# Patient Record
Sex: FEMALE | Race: WHITE | HISPANIC OR LATINO | Employment: UNEMPLOYED | URBAN - METROPOLITAN AREA
[De-identification: names, ages, dates, MRNs, and addresses within clinical notes are randomized per-mention and may not be internally consistent; named-entity substitution may affect disease eponyms.]

---

## 2017-01-24 ENCOUNTER — ALLSCRIPTS OFFICE VISIT (OUTPATIENT)
Dept: OTHER | Facility: OTHER | Age: 62
End: 2017-01-24

## 2017-04-18 ENCOUNTER — ALLSCRIPTS OFFICE VISIT (OUTPATIENT)
Dept: OTHER | Facility: OTHER | Age: 62
End: 2017-04-18

## 2017-05-16 ENCOUNTER — ALLSCRIPTS OFFICE VISIT (OUTPATIENT)
Dept: OTHER | Facility: OTHER | Age: 62
End: 2017-05-16

## 2017-08-08 ENCOUNTER — ALLSCRIPTS OFFICE VISIT (OUTPATIENT)
Dept: OTHER | Facility: OTHER | Age: 62
End: 2017-08-08

## 2017-10-23 ENCOUNTER — HOSPITAL ENCOUNTER (OUTPATIENT)
Dept: RADIOLOGY | Age: 62
Discharge: HOME | End: 2017-10-23
Payer: COMMERCIAL

## 2017-10-23 DIAGNOSIS — Z12.31 ENCOUNTER FOR SCREENING MAMMOGRAM FOR MALIGNANT NEOPLASM OF BREAST: ICD-10-CM

## 2017-10-23 PROCEDURE — G0202 SCR MAMMO BI INCL CAD: HCPCS

## 2017-11-02 ENCOUNTER — ALLSCRIPTS OFFICE VISIT (OUTPATIENT)
Dept: OTHER | Facility: OTHER | Age: 62
End: 2017-11-02

## 2018-01-10 NOTE — PSYCH
Psych Med Mgmt    Appearance: was calm and cooperative  Observed mood: mood appropriate  Observed mood: affect appropriate  Speech: a normal rate  Thought processes: coherent/organized  Hallucinations: no hallucinations present  Thought Content: no delusions  Abnormal Thoughts: The patient has no suicidal thoughts  Orientation: The patient is oriented to person, place and time  Recent and Remote Memory: short term memory intact and long term memory intact  Attention Span And Concentration: concentration intact  Insight: Insight intact  Judgment: Her judgment was intact  Treatment Recommendations: Follow up in 3 months  Increase Lamictal to two 25 mg tablets daily  Continue on 20 mg of Adderall and include 15 extra pills if needed for heavy concentration  She reports increased appetite, normal energy level, no weight change and normal number of sleep hours  Patient reports that she has been doing fairly well on her medications, but has been feeling overwhelmed by various political situations that have occurred over the past several months, including the election and death of Alee Rosario  She reports that she has only been taking one Lamictal daily, but would like to increase it two, which had been effective in the past  The current dose of Adderall has been helpful for concentrating on simple things  She is not having any cardiac symptoms  She is eating and sleeping well, and trying to become more physically active  Assessment    1  ADHD (attention deficit hyperactivity disorder) (314 01) (F90 9)   2  Bipolar affective disorder in remission (296 80) (F31 70)    Plan    1  Amphetamine-Dextroamphet ER 20 MG Oral Capsule Extended Release 24   Hour; TAKE 1 CAPSULE DAILY FOR ADHD, and may take an additional   tablet if needed    2   LamoTRIgine 25 MG Oral Tablet (LaMICtal); TAKE 2 TABLETS DAILY    Review of Systems    Constitutional: No fever, no chills, feels well, no tiredness, no recent weight gain or loss  Cardiovascular: no complaints of slow or fast heart rate, no chest pain, no palpitations  Respiratory: no complaints of shortness of breath, no wheezing, no dyspnea on exertion  Gastrointestinal: no complaints of abdominal pain, no constipation, no nausea, no diarrhea, no vomiting  Genitourinary: no complaints of dysuria, no incontinence, no pelvic pain, no urinary frequency  Musculoskeletal: no complaints of arthralgia, no myalgias, no limb pain, no joint stiffness  Integumentary: no complaints of skin rash, no itching, no dry skin  Active Problems    1  ADHD (attention deficit hyperactivity disorder) (314 01) (F90 9)   2  Bipolar affective disorder in remission (296 80) (F31 70)    Allergies    1  ACE Inhibitors    Current Meds   1  Amphetamine-Dextroamphet ER 20 MG Oral Capsule Extended Release 24 Hour; TAKE   1 CAPSULE DAILY FOR ADHD; Therapy: 88VOD8738 to (Evaluate:12Oct2016); Last Rx:05Oct2016 Ordered   2  LamoTRIgine 25 MG Oral Tablet; one daily for 2 weeks then increase to one twice daily; Therapy: 85ZDC4313 to (Last Rx:05Oct2016)  Requested for: 05Oct2016 Ordered    Family Psych History  Mother    1  No pertinent family history    Social History    · Never a smoker    End of Encounter Meds    1  Amphetamine-Dextroamphet ER 20 MG Oral Capsule Extended Release 24 Hour; TAKE   1 CAPSULE DAILY FOR ADHD, and may take an additional tablet if   needed; Therapy: 43KAM3500 to (Evaluate:83Xil4904); Last Rx:24Jan2017 Ordered    2  LamoTRIgine 25 MG Oral Tablet (LaMICtal); TAKE 2 TABLETS DAILY;    Therapy: 03UAD9702 to [phone])  Requested for: 04VIU6681; Last   GD:95ABF5502 Ordered    Signatures   Electronically signed by : Christine Mendoza MD; Jan 24 2017  4:35PM EST                       (Author)

## 2018-01-14 NOTE — PSYCH
Psych Med Mgmt    Appearance: was calm and cooperative  Observed mood: mood appropriate  Observed mood: affect appropriate  Speech: a normal rate  Thought processes: coherent/organized  Hallucinations: no hallucinations present  Thought Content: no delusions  Abnormal Thoughts: The patient has no suicidal thoughts  Orientation: The patient is oriented to person, place and time  Recent and Remote Memory: short term memory intact and long term memory intact  Attention Span And Concentration: concentration intact  Insight: Insight intact  Judgment: Her judgment was intact  Treatment Recommendations: continue on current medications and follow up in 3 months  She reports normal appetite, normal energy level, no weight change and normal number of sleep hours  Patient has been stable on her current medications  At her last visit, Wellbutrin 150 mg QD was added, which she feels has been beneficial in helping to lift her mood  She still notices that she sometimes feels unmotivated doing certain things, but is comfortable keeping her current dose  She recently lost her 1year old great nephew after he drowned in the [place] last month (July 15th)  She says that is was a sad event, but "hasn't kept me from being able to function " She was open to talking about the event, and did not become tearful at any point during the conversation  She continues to eat and sleep well  Plan    1  Amphetamine-Dextroamphet ER 20 MG Oral Capsule Extended Release 24   Hour; TAKE 2 CAPSULES DAILY    2  BuPROPion HCl ER (XL) 150 MG Oral Tablet Extended Release 24 Hour; TAKE 1   TABLET DAILY AS DIRECTED   3  LamoTRIgine 25 MG Oral Tablet (LaMICtal); TAKE 2 TABLETS DAILY    Review of Systems    Constitutional: No fever, no chills, feels well, no tiredness, no recent weight gain or loss  Cardiovascular: no complaints of slow or fast heart rate, no chest pain, no palpitations     Respiratory: no complaints of shortness of breath, no wheezing, no dyspnea on exertion  Gastrointestinal: no complaints of abdominal pain, no constipation, no nausea, no diarrhea, no vomiting  Genitourinary: no complaints of dysuria, no incontinence, no pelvic pain, no urinary frequency  Musculoskeletal: no complaints of arthralgia, no myalgias, no limb pain, no joint stiffness  Integumentary: no complaints of skin rash, no itching, no dry skin  Neurological: no complaints of headache, no confusion, no numbness, no dizziness  Active Problems    1  ADHD (attention deficit hyperactivity disorder) (314 01) (F90 9)   2  Bipolar affective disorder in remission (296 80) (F31 70)    Allergies    1  ACE Inhibitors    Current Meds   1  Amphetamine-Dextroamphet ER 20 MG Oral Capsule Extended Release 24 Hour; TAKE   2 CAPSULES DAILY; Therapy: 03ONN9274 to (Evaluate:15Jun2017); Last Rx:65Cwa9947 Ordered   2  BuPROPion HCl ER (XL) 150 MG Oral Tablet Extended Release 24 Hour; TAKE 1   TABLET DAILY AS DIRECTED; Therapy: 17XGD6393 to (Evaluate:48Fsq0080); Last Rx:64Fzt4582 Ordered   3  LamoTRIgine 25 MG Oral Tablet; TAKE 2 TABLETS DAILY; Therapy: 99GZX5596 to (Evaluate:15Oct2017)  Requested for: 18Apr2017; Last   Rx:33Qwk6621 Ordered    Family Psych History  Mother    1  No pertinent family history    Social History    · Never a smoker    End of Encounter Meds    1  Amphetamine-Dextroamphet ER 20 MG Oral Capsule Extended Release 24 Hour; TAKE   2 CAPSULES DAILY; Therapy: 20ATA0275 to (Evaluate:80Yui7294); Last Rx:02Pfp2800 Ordered    2  BuPROPion HCl ER (XL) 150 MG Oral Tablet Extended Release 24 Hour; TAKE 1   TABLET DAILY AS DIRECTED; Therapy: 52MIA2640 to (Evaluate:06Nov2017); Last Rx:87Sla0413 Ordered   3  LamoTRIgine 25 MG Oral Tablet (LaMICtal); TAKE 2 TABLETS DAILY;    Therapy: 73GUV4987 to (Evaluate:26Rgq8125)  Requested for: 89Agq7981; Last   Rx:71Mmx3164 Ordered    Signatures   Electronically signed by : Colby Au Davida Willett, [facility];  Aug  8 2017  4:16PM EST                       (Author)    Electronically signed by : Sheree Potter MD; Aug  8 2017  4:39PM EST

## 2018-01-14 NOTE — PSYCH
Psych Med Mgmt    Appearance: was calm and cooperative  Observed mood: mood appropriate  Observed mood: affect appropriate  Speech: a normal rate  Thought processes: coherent/organized  Hallucinations: no hallucinations present  Thought Content: no delusions  Abnormal Thoughts: The patient has no suicidal thoughts  Orientation: The patient is oriented to person, place and time  Recent and Remote Memory: short term memory intact and long term memory intact  Attention Span And Concentration: concentration intact  Insight: Insight intact  Judgment: Her judgment was intact  Treatment Recommendations: continue on current medications and follow up in 3 months  She reports normal appetite, normal energy level, no weight change and normal number of sleep hours  Patient reports that she has been feeling very good because her "life is finally starting to turn around " Her divorce is finally moving forward, and she recently got a settlement on her house as well as got to keep a lot of the belongings from the house  This had been causing her a lot of stress at one point  Patient's focus and concentration has been maintained on the Adderall, and she is tolerating the Wellbutrin and Lamictal without serious side effects  She does notice dry mouth at times, but this is tolerable  Sleep has been improving and she is on a more consistent sleep schedule  Appetite is good  Plan    1  Amphetamine-Dextroamphet ER 20 MG Oral Capsule Extended Release 24   Hour; TAKE 2 CAPSULES DAILY    2  BuPROPion HCl ER (XL) 150 MG Oral Tablet Extended Release 24 Hour; TAKE 1   TABLET DAILY AS DIRECTED   3  LamoTRIgine 25 MG Oral Tablet (LaMICtal); TAKE 2 TABLETS DAILY    Review of Systems    Constitutional: No fever, no chills, feels well, no tiredness, no recent weight gain or loss  Cardiovascular: no complaints of slow or fast heart rate, no chest pain, no palpitations     Respiratory: no complaints of shortness of breath, no wheezing, no dyspnea on exertion  Gastrointestinal: no complaints of abdominal pain, no constipation, no nausea, no diarrhea, no vomiting  Genitourinary: no complaints of dysuria, no incontinence, no pelvic pain, no urinary frequency  Musculoskeletal: no complaints of arthralgia, no myalgias, no limb pain, no joint stiffness  Integumentary: no complaints of skin rash, no itching, no dry skin  Neurological: no complaints of headache, no confusion, no numbness, no dizziness  Active Problems    1  ADHD (attention deficit hyperactivity disorder) (314 01) (F90 9)   2  Bipolar affective disorder in remission (296 80) (F31 70)    Allergies    1  ACE Inhibitors    Current Meds   1  Amphetamine-Dextroamphet ER 20 MG Oral Capsule Extended Release 24 Hour; TAKE   2 CAPSULES DAILY; Therapy: 38QYR5831 to (Evaluate:75Bul1990); Last Rx:08Aug2017 Ordered   2  BuPROPion HCl ER (XL) 150 MG Oral Tablet Extended Release 24 Hour; TAKE 1   TABLET DAILY AS DIRECTED; Therapy: 16FGH2758 to (Evaluate:06Nov2017); Last Rx:90Ing2019 Ordered   3  LamoTRIgine 25 MG Oral Tablet; TAKE 2 TABLETS DAILY; Therapy: 33QOB4427 to (Evaluate:18Tsw7942)  Requested for: 01Sji5480; Last   Rx:08Aug2017 Ordered    Family Psych History  Mother    1  No pertinent family history    Social History    · Never a smoker    End of Encounter Meds    1  Amphetamine-Dextroamphet ER 20 MG Oral Capsule Extended Release 24 Hour; TAKE   2 CAPSULES DAILY; Therapy: 30UPL3730 to (Evaluate:77Hwt8690); Last Rx:02Nov2017 Ordered    2  BuPROPion HCl ER (XL) 150 MG Oral Tablet Extended Release 24 Hour; TAKE 1   TABLET DAILY AS DIRECTED; Therapy: 01AWK7980 to (Evaluate:31Jan2018); Last Rx:02Nov2017 Ordered   3  LamoTRIgine 25 MG Oral Tablet (LaMICtal); TAKE 2 TABLETS DAILY;    Therapy: 04EJP9056 to (Lennox Barrett)  Requested for: 33KJB9818; Last   Rx:02Nov2017 Ordered    Signatures   Electronically signed by : Milly Hancock, [facility]; Nov 2 2017  4:46PM EST                       (Author)    Electronically signed by : Tara Pete MD; Nov 2 2017  4:50PM EST

## 2018-01-15 ENCOUNTER — ALLSCRIPTS OFFICE VISIT (OUTPATIENT)
Dept: OTHER | Facility: OTHER | Age: 63
End: 2018-01-15

## 2018-01-16 NOTE — PSYCH
Psych Med Mgmt    Appearance: adequate hygiene and grooming, restless and fidgety and good eye contact  Observed mood: was dysphoric, depressed and anxious  Observed mood: affect appropriate   mood congruent  Speech: a normal rate and fluent  Thought processes: coherent/organized  Hallucinations: no hallucinations present  Thought Content: no delusions  Abnormal Thoughts: The patient has no suicidal thoughts and no homicidal thoughts  Orientation: The patient is oriented to person, place and time  Recent and Remote Memory: short term memory intact and long term memory intact  Attention Span And Concentration: concentration intact  Insight: Insight intact  Judgment: Her judgment was intact  Muscle Strength And Tone  Normal gait and station  The patient is experiencing no localized pain  Treatment Recommendations: Resume Adderall 20 mg daily  Restart lamotrigine 25 mg daily for 2 weeks, then increase to 25 mg twice daily  Return to the office in a few weeks  Risks, Benefits And Possible Side Effects Of Medications: Risks, benefits, and possible side effects of medications explained to patient and patient verbalizes understanding  She reports normal appetite, normal energy level and no weight change  seen for bipolar and ADHD  last seen in 9/2015  Since then she has not been taking meds and her symptoms have come back  She lost her mother in June and also her one daughter with drug addiction became pregnant and gave the baby up for adoption  She has been feeling depressed  She denies usage of drugs and alcohol to medicate herself  Denies suicidal thoughts  No significant vegetative signs  Goes to [employer] support groups  She saw a therapist for a short while  Assessment    1  ADHD (attention deficit hyperactivity disorder) (314 01) (F90 9)   2  Bipolar affective disorder in remission (296 80) (F31 70)    Plan    1   Amphetamine-Dextroamphet ER 20 MG Oral Capsule Extended Release 24   Hour; TAKE 1 CAPSULE DAILY FOR ADHD    2  LamoTRIgine 25 MG Oral Tablet (LaMICtal); one daily for 2 weeks then increase   to one twice daily    Review of Systems    Constitutional: No fever, no chills, feels well, no tiredness, no recent weight gain or loss  Active Problems    1  ADHD (attention deficit hyperactivity disorder) (314 01) (F90 9)   2  Bipolar affective disorder in remission (296 80) (F31 70)    Allergies    1  ACE Inhibitors    Current Meds   1  Amphetamine-Dextroamphet ER 20 MG Oral Capsule Extended Release 24 Hour; TAKE   1 CAPSULE DAILY FOR ADHD; Therapy: 67CNB2841 to (Evaluate:41Elr2968); Last Rx:18Xjr6126 Ordered   2  LamoTRIgine 25 MG Oral Tablet; one in AM two at night; Therapy: 18EBN8323 to (Last Rx:46Pfn7667)  Requested for: 30Sep2015 Ordered    Family Psych History  Mother    1  No pertinent family history    Social History    · Never a smoker    End of Encounter Meds    1  Amphetamine-Dextroamphet ER 20 MG Oral Capsule Extended Release 24 Hour; TAKE   1 CAPSULE DAILY FOR ADHD; Therapy: 44KWN5906 to (Evaluate:12Oct2016); Last Rx:05Oct2016 Ordered    2  LamoTRIgine 25 MG Oral Tablet (LaMICtal); one daily for 2 weeks then increase to one   twice daily;    Therapy: 32WXH4488 to (Last Rx:05Oct2016)  Requested for: 36HPG2701 Ordered    Signatures   Electronically signed by : Richy Arndt MD; Oct  5 2016  4:29PM EST                       (Author)

## 2018-01-17 NOTE — PSYCH
Psych Med Mgmt    Appearance: was calm and cooperative  Observed mood: depressed  Observed mood: affect was broad  Speech: a normal rate  Thought processes: coherent/organized  Hallucinations: no hallucinations present  Thought Content: no delusions  Abnormal Thoughts: The patient has no suicidal thoughts  Orientation: The patient is oriented to person, place and time  Recent and Remote Memory: short term memory intact and long term memory intact  Attention Span And Concentration: concentration intact  Insight: Insight intact  Judgment: Her judgment was intact  Treatment Recommendations: Begin patient on Wellbutrin 150 mg XL for depression and continue with Adderal and Lamictal as prescribed  She reports normal appetite, normal energy level, no weight change and normal number of sleep hours  Patient reports that she is feeling somewhat better than she did when she came in for her last appointment  Still endorses some depression, but states that she notices an improvement in her mood now that she is finally completing tasks that she had been procrastinating on for the past several months  She feels less overwhelmed with work now that she is crossing things off of her to-do list  She feels that she is focusing better  She is getting about 8 hours of sleep per night, but her sleep pattern is erratic  Sometimes she will sleep from 4am-12 pm, while other nights she will sleep from 12 am- 8 am  Her appetite is good  No additional concerns  Assessment    1  ADHD (attention deficit hyperactivity disorder) (314 01) (F90 9)   2  Bipolar affective disorder in remission (296 80) (F31 70)    Plan    1  Amphetamine-Dextroamphet ER 20 MG Oral Capsule Extended Release 24   Hour; TAKE 2 CAPSULES DAILY    2   BuPROPion HCl ER (XL) 150 MG Oral Tablet Extended Release 24 Hour; TAKE 1   TABLET DAILY AS DIRECTED    Review of Systems    Constitutional: No fever, no chills, feels well, no tiredness, no recent weight gain or loss  Cardiovascular: no complaints of slow or fast heart rate, no chest pain, no palpitations  Respiratory: no complaints of shortness of breath, no wheezing, no dyspnea on exertion  Gastrointestinal: no complaints of abdominal pain, no constipation, no nausea, no diarrhea, no vomiting  Genitourinary: no complaints of dysuria, no incontinence, no pelvic pain, no urinary frequency  Musculoskeletal: no complaints of arthralgia, no myalgias, no limb pain, no joint stiffness  Integumentary: no complaints of skin rash, no itching, no dry skin  Neurological: no complaints of headache, no confusion, no numbness, no dizziness  Active Problems    1  ADHD (attention deficit hyperactivity disorder) (314 01) (F90 9)   2  Bipolar affective disorder in remission (296 80) (F31 70)    Allergies    1  ACE Inhibitors    Current Meds   1  Amphetamine-Dextroamphet ER 20 MG Oral Capsule Extended Release 24 Hour; TAKE   2 CAPSULES DAILY; Therapy: 09FCH0696 to (Evaluate:18May2017); Last Rx:18Apr2017 Ordered   2  LamoTRIgine 25 MG Oral Tablet; TAKE 2 TABLETS DAILY; Therapy: 09OYU6152 to (Evaluate:15Oct2017)  Requested for: 18Apr2017; Last   Rx:85Cys5728 Ordered    Family Psych History  Mother    1  No pertinent family history    Social History    · Never a smoker    End of Encounter Meds    1  Amphetamine-Dextroamphet ER 20 MG Oral Capsule Extended Release 24 Hour; TAKE   2 CAPSULES DAILY; Therapy: 74UHH3679 to (Evaluate:15Jun2017); Last Rx:16May2017 Ordered    2  BuPROPion HCl ER (XL) 150 MG Oral Tablet Extended Release 24 Hour; TAKE 1   TABLET DAILY AS DIRECTED; Therapy: 65ENF1699 to (Evaluate:95Hbv2052); Last Rx:16May2017 Ordered   3  LamoTRIgine 25 MG Oral Tablet (LaMICtal); TAKE 2 TABLETS DAILY;    Therapy: 05NCW8934 to 21 )  Requested for: 18Apr2017; Last   Rx:18Apr2017 Ordered    Signatures   Electronically signed by : Saige Martin, [facility]; May 16 2017 4:18PM EST                       (Author)    Electronically signed by : Aletha Glasgow MD; May 16 2017  4:52PM EST

## 2018-01-18 NOTE — PSYCH
Psych Med Mgmt    Observed mood: mood appropriate  Observed mood: affect appropriate  Judgment: Her judgment was intact  Muscle Strength And Tone  Muscle strength and tone were normal  Normal gait and station  Treatment Recommendations: increase to 20 mg Adderall BID and continue Lamictal 25 BID  She reports normal appetite, normal energy level, no weight change and normal number of sleep hours  Patient reports that she has been having trouble staying on task and getting work done, which has been causing her to feel depressed and overwhelmed  She says a lot of her stress has been circumstantial because she is in the process of trying to sell a house  She said that she was feeling better on days that she would take 2 Adderall because she was more productive and felt more accomplished  She is sleeping and eating well, and otherwise has no other concerns  Assessment    1  ADHD (attention deficit hyperactivity disorder) (314 01) (F90 9)   2  Bipolar affective disorder in remission (296 80) (F31 70)    Plan    1  Amphetamine-Dextroamphet ER 20 MG Oral Capsule Extended Release 24   Hour; TAKE 2 CAPSULES DAILY    2  LamoTRIgine 25 MG Oral Tablet (LaMICtal); TAKE 2 TABLETS DAILY    Review of Systems    Constitutional: No fever, no chills, feels well, no tiredness, no recent weight gain or loss  Cardiovascular: no complaints of slow or fast heart rate, no chest pain, no palpitations  Respiratory: no complaints of shortness of breath, no wheezing, no dyspnea on exertion  Gastrointestinal: no complaints of abdominal pain, no constipation, no nausea, no diarrhea, no vomiting  Genitourinary: no complaints of dysuria, no incontinence, no pelvic pain, no urinary frequency  Musculoskeletal: no complaints of arthralgia, no myalgias, no limb pain, no joint stiffness  Integumentary: no complaints of skin rash, no itching, no dry skin     Neurological: no complaints of headache, no confusion, no numbness, no dizziness  Active Problems    1  ADHD (attention deficit hyperactivity disorder) (314 01) (F90 9)   2  Bipolar affective disorder in remission (296 80) (F31 70)    Allergies    1  ACE Inhibitors    Current Meds   1  Amphetamine-Dextroamphet ER 20 MG Oral Capsule Extended Release 24 Hour; TAKE   1 CAPSULE DAILY FOR ADHD, and may take an additional tablet if   needed; Therapy: 46UVR2906 to (Evaluate:87Bxo2474); Last Rx:24Jan2017 Ordered   2  LamoTRIgine 25 MG Oral Tablet; TAKE 2 TABLETS DAILY; Therapy: 10UUR0656 to [phone])  Requested for: 45NIY5960; Last   Rx:24Jan2017 Ordered    Family Psych History  Mother    1  No pertinent family history    Social History    · Never a smoker    End of Encounter Meds    1  Amphetamine-Dextroamphet ER 20 MG Oral Capsule Extended Release 24 Hour; TAKE   2 CAPSULES DAILY; Therapy: 30ODP5369 to (Evaluate:66Kam7979); Last Rx:18Apr2017 Ordered    2  LamoTRIgine 25 MG Oral Tablet (LaMICtal); TAKE 2 TABLETS DAILY; Therapy: 54HKV6849 to 21 )  Requested for: 18Apr2017; Last   Rx:18Apr2017 Ordered    Signatures   Electronically signed by : JONATHAN Bliss;  Apr 18 2017  4:08PM EST                       (Author)    Electronically signed by : Randell Sow MD; Apr 18 2017  4:24PM EST                       (Author)

## 2018-01-23 NOTE — PSYCH
Psych Med Mgmt    Appearance: was calm and cooperative, adequate hygiene and grooming and good eye contact  Observed mood: euthymic  Observed mood: affect was broad  Speech: a normal rate and fluent  Thought processes: coherent/organized  Hallucinations: no hallucinations present  Thought Content: no delusions  Abnormal Thoughts: The patient has no suicidal thoughts and no homicidal thoughts  Orientation: The patient is oriented to person, place and time  Attention Span And Concentration: concentration intact  Insight: Insight intact  Judgment: Her judgment was intact  Muscle Strength And Tone  Muscle strength and tone were normal  Normal gait and station  Treatment Recommendations: continue with same medication  RTO in 3 months  Risks, Benefits And Possible Side Effects Of Medications: Risks, benefits, and possible side effects of medications explained to patient and patient verbalizes understanding  She reports normal appetite, normal energy level, no weight change and normal number of sleep hours  seen for bipolar/ADHD  Her divorce is now finalized  She reports a good response to Wellbutrin 150 mg daily  She has been taking Adderall and Lamictal the same  No new health problems  sleeping and eating well  Assessment    1  Bipolar affective disorder in remission (296 80) (F31 70)   2  ADHD (attention deficit hyperactivity disorder) (314 01) (F90 9)    Plan    1  Amphetamine-Dextroamphet ER 20 MG Oral Capsule Extended Release 24   Hour; TAKE 2 CAPSULES DAILY    2  BuPROPion HCl ER (XL) 150 MG Oral Tablet Extended Release 24 Hour; TAKE 1   TABLET DAILY AS DIRECTED   3  LamoTRIgine 25 MG Oral Tablet (LaMICtal); TAKE 2 TABLETS DAILY    Review of Systems    Constitutional: No fever, no chills, feels well, no tiredness, no recent weight gain or loss  Cardiovascular: no complaints of slow or fast heart rate, no chest pain, no palpitations     Respiratory: no complaints of shortness of breath, no wheezing, no dyspnea on exertion  Gastrointestinal: no complaints of abdominal pain, no constipation, no nausea, no diarrhea, no vomiting  Genitourinary: no complaints of dysuria, no incontinence, no pelvic pain, no urinary frequency  Musculoskeletal: no complaints of arthralgia, no myalgias, no limb pain, no joint stiffness  Integumentary: no complaints of skin rash, no itching, no dry skin  Neurological: no complaints of headache, no confusion, no numbness, no dizziness  Active Problems    1  ADHD (attention deficit hyperactivity disorder) (314 01) (F90 9)   2  Bipolar affective disorder in remission (296 80) (F31 70)    Allergies    1  ACE Inhibitors    Current Meds   1  Amphetamine-Dextroamphet ER 20 MG Oral Capsule Extended Release 24 Hour; TAKE   2 CAPSULES DAILY; Therapy: [id number] to (Evaluate:36Omg1400); Last Rx:02Nov2017 Ordered   2  BuPROPion HCl ER (XL) 150 MG Oral Tablet Extended Release 24 Hour; TAKE 1   TABLET DAILY AS DIRECTED; Therapy: [id number] to (Evaluate:55Sru9213)  Requested for: 22Nov2017; Last   Rx:22Nov2017 Ordered   3  LamoTRIgine 25 MG Oral Tablet; TAKE 2 TABLETS DAILY; Therapy: [id number] to (Lola Gess)  Requested for: 46SFW4307; Last   Rx:02Nov2017 Ordered    Family Psych History  Mother    1  No pertinent family history    Social History    · Never a smoker    End of Encounter Meds    1  Amphetamine-Dextroamphet ER 20 MG Oral Capsule Extended Release 24 Hour; TAKE   2 CAPSULES DAILY; Therapy: 87HUD4430 to (Evaluate:87Urq6624); Last Rx:15Jan2018 Ordered    2  BuPROPion HCl ER (XL) 150 MG Oral Tablet Extended Release 24 Hour; TAKE 1   TABLET DAILY AS DIRECTED; Therapy: 09LJM3734 to (Wally Jimmy)  Requested for: 78JWM0200; Last   Rx:15Jan2018; Status: ACTIVE - Transmit to Pharmacy - Awaiting Verification Ordered   3  LamoTRIgine 25 MG Oral Tablet (LaMICtal); TAKE 2 TABLETS DAILY;    Therapy: 78JQG0116 to (Evaluate:50Ske0718) Requested for: 05HJZ7467;  Last   Rx:15Jan2018; Status: [address] dunia Allen Verification Ordered    Signatures   Electronically signed by : Nadia Cameron MD; Fawad 15 2018  4:23PM EST                       (Author)

## 2018-04-09 ENCOUNTER — TELEPHONE (OUTPATIENT)
Dept: PSYCHIATRY | Facility: CLINIC | Age: 63
End: 2018-04-09

## 2018-05-17 ENCOUNTER — OFFICE VISIT (OUTPATIENT)
Dept: PSYCHIATRY | Facility: CLINIC | Age: 63
End: 2018-05-17
Payer: COMMERCIAL

## 2018-05-17 DIAGNOSIS — F31.70 BIPOLAR AFFECTIVE DISORDER IN REMISSION (HCC): ICD-10-CM

## 2018-05-17 DIAGNOSIS — F90.2 ATTENTION DEFICIT HYPERACTIVITY DISORDER (ADHD), COMBINED TYPE: Primary | ICD-10-CM

## 2018-05-17 PROCEDURE — 99213 OFFICE O/P EST LOW 20 MIN: CPT | Performed by: PSYCHIATRY & NEUROLOGY

## 2018-05-17 RX ORDER — LAMOTRIGINE 25 MG/1
50 TABLET ORAL DAILY
Qty: 60 TABLET | Refills: 2 | Status: SHIPPED | OUTPATIENT
Start: 2018-05-17 | End: 2018-08-13 | Stop reason: SDUPTHER

## 2018-05-17 RX ORDER — DEXTROAMPHETAMINE SACCHARATE, AMPHETAMINE ASPARTATE MONOHYDRATE, DEXTROAMPHETAMINE SULFATE AND AMPHETAMINE SULFATE 5; 5; 5; 5 MG/1; MG/1; MG/1; MG/1
2 CAPSULE, EXTENDED RELEASE ORAL DAILY
COMMUNITY
Start: 2015-09-30 | End: 2018-05-17 | Stop reason: SDUPTHER

## 2018-05-17 RX ORDER — LAMOTRIGINE 25 MG/1
2 TABLET ORAL DAILY
COMMUNITY
Start: 2015-09-30 | End: 2018-05-17 | Stop reason: SDUPTHER

## 2018-05-17 RX ORDER — DEXTROAMPHETAMINE SACCHARATE, AMPHETAMINE ASPARTATE MONOHYDRATE, DEXTROAMPHETAMINE SULFATE AND AMPHETAMINE SULFATE 5; 5; 5; 5 MG/1; MG/1; MG/1; MG/1
40 CAPSULE, EXTENDED RELEASE ORAL DAILY
Qty: 60 CAPSULE | Refills: 0 | Status: SHIPPED | OUTPATIENT
Start: 2018-05-17 | End: 2018-06-18 | Stop reason: SDUPTHER

## 2018-05-17 NOTE — PSYCH
Patient ID: Amadeo Kiran is a 61 y o  female  HPI ROS Appetite Changes and Sleep: normal appetite, normal energy level, no weight change and normal number of sleep hours    Review Of Systems:     Mood Normal   Thought Content Normal   General Normal    Gastrointestinal Normal   Neurological Normal        Laboratory Results: No results found for this or any previous visit  Subjective:      Seems to be holding her own with no particular complaints  She is tolerating her medications well  She has not had any fluctuations in her mood  Functioning at her baseline    Objective:   Stable and in remission    Mental status:  Appearance calm and cooperative , adequate hygiene and grooming and good eye contact    Mood euthymic   Affect affect appropriate    Speech a normal rate and fluent   Thought Processes coherent/organized   Hallucinations no hallucinations present    Thought Content no delusional thoughts verbalized   Abnormal Thoughts no suicidal thoughts  and no homicidal thoughts    Orientation A+O x 3   Remote Memory short term memory intact   Attention Span concentration intact   Intellect Not Formally Assessed   Insight Insight intact   Judgement judgment was intact   Muscle Strength Normal gait    Language no difficulty naming common objects and no difficulty repeating a phrase    Pain none       Assessment/Plan:       There are no diagnoses linked to this encounter          Treatment Recommendations- Risks Benefits      Risks, Benefits And Possible Side Effects Of Medications:  Risks, benefits, and possible side effects of medications explained to patient and patient verbalizes understanding

## 2018-06-18 ENCOUNTER — TELEPHONE (OUTPATIENT)
Dept: PSYCHIATRY | Facility: CLINIC | Age: 63
End: 2018-06-18

## 2018-06-18 DIAGNOSIS — F90.2 ATTENTION DEFICIT HYPERACTIVITY DISORDER (ADHD), COMBINED TYPE: ICD-10-CM

## 2018-06-18 RX ORDER — DEXTROAMPHETAMINE SACCHARATE, AMPHETAMINE ASPARTATE MONOHYDRATE, DEXTROAMPHETAMINE SULFATE AND AMPHETAMINE SULFATE 5; 5; 5; 5 MG/1; MG/1; MG/1; MG/1
40 CAPSULE, EXTENDED RELEASE ORAL DAILY
Qty: 60 CAPSULE | Refills: 0 | Status: SHIPPED | OUTPATIENT
Start: 2018-06-18 | End: 2018-08-13 | Stop reason: SDUPTHER

## 2018-07-12 ENCOUNTER — TELEPHONE (OUTPATIENT)
Dept: PSYCHIATRY | Facility: CLINIC | Age: 63
End: 2018-07-12

## 2018-07-12 DIAGNOSIS — F32.9 MDD (MAJOR DEPRESSIVE DISORDER): Primary | ICD-10-CM

## 2018-07-12 RX ORDER — BUPROPION HYDROCHLORIDE 150 MG/1
150 TABLET ORAL DAILY
Qty: 90 TABLET | Refills: 0 | Status: SHIPPED | OUTPATIENT
Start: 2018-07-12 | End: 2018-08-13

## 2018-07-12 RX ORDER — BUPROPION HYDROCHLORIDE 150 MG/1
1 TABLET ORAL DAILY
COMMUNITY
Start: 2017-05-16 | End: 2018-07-12 | Stop reason: SDUPTHER

## 2018-08-13 ENCOUNTER — OFFICE VISIT (OUTPATIENT)
Dept: PSYCHIATRY | Facility: CLINIC | Age: 63
End: 2018-08-13
Payer: COMMERCIAL

## 2018-08-13 DIAGNOSIS — F31.70 BIPOLAR AFFECTIVE DISORDER IN REMISSION (HCC): ICD-10-CM

## 2018-08-13 DIAGNOSIS — F90.2 ATTENTION DEFICIT HYPERACTIVITY DISORDER (ADHD), COMBINED TYPE: Primary | ICD-10-CM

## 2018-08-13 PROCEDURE — 99213 OFFICE O/P EST LOW 20 MIN: CPT | Performed by: PSYCHIATRY & NEUROLOGY

## 2018-08-13 RX ORDER — BUPROPION HYDROCHLORIDE 150 MG/1
150 TABLET ORAL DAILY
Qty: 90 TABLET | Refills: 0 | Status: SHIPPED | OUTPATIENT
Start: 2018-08-13 | End: 2018-11-14 | Stop reason: SDUPTHER

## 2018-08-13 RX ORDER — DEXTROAMPHETAMINE SACCHARATE, AMPHETAMINE ASPARTATE MONOHYDRATE, DEXTROAMPHETAMINE SULFATE AND AMPHETAMINE SULFATE 5; 5; 5; 5 MG/1; MG/1; MG/1; MG/1
40 CAPSULE, EXTENDED RELEASE ORAL DAILY
Qty: 180 CAPSULE | Refills: 0 | Status: SHIPPED | OUTPATIENT
Start: 2018-08-13 | End: 2018-11-14 | Stop reason: SDUPTHER

## 2018-08-13 RX ORDER — LAMOTRIGINE 25 MG/1
50 TABLET ORAL DAILY
Qty: 180 TABLET | Refills: 0 | Status: SHIPPED | OUTPATIENT
Start: 2018-08-13 | End: 2018-11-14 | Stop reason: SDUPTHER

## 2018-08-13 NOTE — PSYCH
Patient ID: Sammi Garzon is a 61 y o  female  HPI ROS Appetite Changes and Sleep: normal appetite, normal energy level, no weight change and normal number of sleep hours    Review Of Systems:     Mood Normal   Thought Content Normal   General Normal    Gastrointestinal Normal   Neurological Normal        Laboratory Results: No results found for this or any previous visit  Subjective:      Holding her own-No new health problems  Daughter with opioid dependence is doing good  No side effects      Objective:     Symptoms are in remission  Mental status:  Appearance calm and cooperative , adequate hygiene and grooming and good eye contact    Mood euthymic   Affect affect was broad   Speech Normal rate and Normal volume   Thought Processes coherent/organized   Hallucinations no hallucinations present    Thought Content no delusional thoughts verbalized   Abnormal Thoughts no suicidal thoughts  and no homicidal thoughts    Orientation A+O x 3   Memory function Not tested   Attention Span concentration intact   Intellect Not Formally Assessed   Insight Insight intact   Judgement judgment was intact   Muscle Strength Muscle strength and tone were normal and Normal gait    Language no difficulty naming common objects and no difficulty repeating a phrase    Pain none       Assessment/Plan:       Diagnoses and all orders for this visit:    Attention deficit hyperactivity disorder (ADHD), combined type  -     amphetamine-dextroamphetamine (ADDERALL XR) 20 MG 24 hr capsule; Take 2 capsules (40 mg total) by mouth daily Max Daily Amount: 40 mg    Bipolar affective disorder in remission (HCC)  -     buPROPion (WELLBUTRIN XL) 150 mg 24 hr tablet; Take 1 tablet (150 mg total) by mouth daily  -     lamoTRIgine (LaMICtal) 25 mg tablet;  Take 2 tablets (50 mg total) by mouth daily            Treatment Recommendations- Risks Benefits      Risks, Benefits And Possible Side Effects Of Medications:  Risks, benefits, and possible side effects of medications explained to patient and patient verbalizes understanding

## 2018-10-25 ENCOUNTER — HOSPITAL ENCOUNTER (OUTPATIENT)
Dept: RADIOLOGY | Age: 63
Discharge: HOME | End: 2018-10-25
Payer: COMMERCIAL

## 2018-10-25 ENCOUNTER — TRANSCRIBE ORDERS (OUTPATIENT)
Dept: ADMINISTRATIVE | Age: 63
End: 2018-10-25

## 2018-10-25 DIAGNOSIS — Z12.31 ENCOUNTER FOR SCREENING MAMMOGRAM FOR MALIGNANT NEOPLASM OF BREAST: ICD-10-CM

## 2018-10-25 PROCEDURE — 77067 SCR MAMMO BI INCL CAD: CPT

## 2018-11-14 ENCOUNTER — OFFICE VISIT (OUTPATIENT)
Dept: PSYCHIATRY | Facility: CLINIC | Age: 63
End: 2018-11-14
Payer: COMMERCIAL

## 2018-11-14 DIAGNOSIS — F90.2 ATTENTION DEFICIT HYPERACTIVITY DISORDER (ADHD), COMBINED TYPE: Primary | ICD-10-CM

## 2018-11-14 DIAGNOSIS — F31.70 BIPOLAR AFFECTIVE DISORDER IN REMISSION (HCC): ICD-10-CM

## 2018-11-14 PROCEDURE — 99213 OFFICE O/P EST LOW 20 MIN: CPT | Performed by: PSYCHIATRY & NEUROLOGY

## 2018-11-14 RX ORDER — BUPROPION HYDROCHLORIDE 150 MG/1
150 TABLET ORAL DAILY
Qty: 90 TABLET | Refills: 0 | Status: SHIPPED | OUTPATIENT
Start: 2018-11-14 | End: 2018-12-05 | Stop reason: SDUPTHER

## 2018-11-14 RX ORDER — LAMOTRIGINE 25 MG/1
50 TABLET ORAL DAILY
Qty: 180 TABLET | Refills: 0 | Status: SHIPPED | OUTPATIENT
Start: 2018-11-14 | End: 2019-05-02 | Stop reason: SDUPTHER

## 2018-11-14 RX ORDER — DEXTROAMPHETAMINE SACCHARATE, AMPHETAMINE ASPARTATE MONOHYDRATE, DEXTROAMPHETAMINE SULFATE AND AMPHETAMINE SULFATE 5; 5; 5; 5 MG/1; MG/1; MG/1; MG/1
40 CAPSULE, EXTENDED RELEASE ORAL DAILY
Qty: 180 CAPSULE | Refills: 0 | Status: SHIPPED | OUTPATIENT
Start: 2018-11-14 | End: 2019-05-02 | Stop reason: SDUPTHER

## 2018-11-14 NOTE — PSYCH
Patient ID: Magdy Chacon is a 61 y o  female  HPI ROS Appetite Changes and Sleep: normal appetite, normal energy level, no weight change and normal number of sleep hours    Review Of Systems:     Mood Depression   Thought Content Normal   General Emotional Problems   Gastrointestinal Normal   Neurological Normal        Laboratory Results: No results found for this or any previous visit  Subjective:    Seen for ADHD and bipolar disorder  Her daughter  on 10/9/18 due to an accidental drug overdose  She is grief stricken as expected  She wants to have grief counseling and group therapy  She is not showing any signs of relapse  Objective:     Normal grief  Mental status:  Appearance calm and cooperative , adequate hygiene and grooming and good eye contact    Mood depressed   Affect affect was constricted, affect was tearful and affect appropriate    Speech Normal rate and Normal volume   Thought Processes coherent/organized   Hallucinations no hallucinations present    Thought Content no delusional thoughts verbalized   Abnormal Thoughts no suicidal thoughts  and no homicidal thoughts    Orientation A+O x 3   Memory function Not tested   Attention Span concentration intact   Intellect Not Formally Assessed   Insight Insight intact   Judgement judgment was intact   Muscle Strength Muscle strength and tone were normal and Normal gait    Language no difficulty naming common objects and no difficulty repeating a phrase    Pain none       Assessment/Plan:       Diagnoses and all orders for this visit:    Attention deficit hyperactivity disorder (ADHD), combined type  -     amphetamine-dextroamphetamine (ADDERALL XR) 20 MG 24 hr capsule; Take 2 capsules (40 mg total) by mouth daily Max Daily Amount: 40 mg    Bipolar affective disorder in remission (HCC)  -     buPROPion (WELLBUTRIN XL) 150 mg 24 hr tablet; Take 1 tablet (150 mg total) by mouth daily  -     lamoTRIgine (LaMICtal) 25 mg tablet;  Take 2 tablets (50 mg total) by mouth daily            Treatment Recommendations- Risks Benefits      Risks, Benefits And Possible Side Effects Of Medications:  Risks, benefits, and possible side effects of medications explained to patient and patient verbalizes understanding

## 2018-12-05 ENCOUNTER — TELEPHONE (OUTPATIENT)
Dept: PSYCHIATRY | Facility: CLINIC | Age: 63
End: 2018-12-05

## 2018-12-05 DIAGNOSIS — F31.70 BIPOLAR AFFECTIVE DISORDER IN REMISSION (HCC): ICD-10-CM

## 2018-12-05 RX ORDER — BUPROPION HYDROCHLORIDE 150 MG/1
150 TABLET ORAL DAILY
Qty: 90 TABLET | Refills: 0 | Status: SHIPPED | OUTPATIENT
Start: 2018-12-05 | End: 2019-05-02 | Stop reason: SDUPTHER

## 2019-05-02 ENCOUNTER — OFFICE VISIT (OUTPATIENT)
Dept: PSYCHIATRY | Facility: CLINIC | Age: 64
End: 2019-05-02
Payer: COMMERCIAL

## 2019-05-02 DIAGNOSIS — F31.70 BIPOLAR AFFECTIVE DISORDER IN REMISSION (HCC): ICD-10-CM

## 2019-05-02 DIAGNOSIS — F90.2 ATTENTION DEFICIT HYPERACTIVITY DISORDER (ADHD), COMBINED TYPE: ICD-10-CM

## 2019-05-02 PROCEDURE — 99213 OFFICE O/P EST LOW 20 MIN: CPT | Performed by: PSYCHIATRY & NEUROLOGY

## 2019-05-02 RX ORDER — BUPROPION HYDROCHLORIDE 150 MG/1
150 TABLET ORAL DAILY
Qty: 90 TABLET | Refills: 0 | Status: SHIPPED | OUTPATIENT
Start: 2019-05-02 | End: 2019-09-05 | Stop reason: SDUPTHER

## 2019-05-02 RX ORDER — LAMOTRIGINE 25 MG/1
50 TABLET ORAL DAILY
Qty: 180 TABLET | Refills: 0 | Status: SHIPPED | OUTPATIENT
Start: 2019-05-02 | End: 2019-09-05 | Stop reason: SDUPTHER

## 2019-05-02 RX ORDER — DEXTROAMPHETAMINE SACCHARATE, AMPHETAMINE ASPARTATE MONOHYDRATE, DEXTROAMPHETAMINE SULFATE AND AMPHETAMINE SULFATE 5; 5; 5; 5 MG/1; MG/1; MG/1; MG/1
40 CAPSULE, EXTENDED RELEASE ORAL DAILY
Qty: 180 CAPSULE | Refills: 0 | Status: SHIPPED | OUTPATIENT
Start: 2019-05-02 | End: 2019-09-05 | Stop reason: SDUPTHER

## 2019-09-05 ENCOUNTER — OFFICE VISIT (OUTPATIENT)
Dept: PSYCHIATRY | Facility: CLINIC | Age: 64
End: 2019-09-05
Payer: COMMERCIAL

## 2019-09-05 DIAGNOSIS — F90.2 ATTENTION DEFICIT HYPERACTIVITY DISORDER (ADHD), COMBINED TYPE: ICD-10-CM

## 2019-09-05 DIAGNOSIS — F31.70 BIPOLAR AFFECTIVE DISORDER IN REMISSION (HCC): Primary | ICD-10-CM

## 2019-09-05 PROCEDURE — 99213 OFFICE O/P EST LOW 20 MIN: CPT | Performed by: PSYCHIATRY & NEUROLOGY

## 2019-09-05 RX ORDER — DEXTROAMPHETAMINE SACCHARATE, AMPHETAMINE ASPARTATE MONOHYDRATE, DEXTROAMPHETAMINE SULFATE AND AMPHETAMINE SULFATE 5; 5; 5; 5 MG/1; MG/1; MG/1; MG/1
40 CAPSULE, EXTENDED RELEASE ORAL DAILY
Qty: 180 CAPSULE | Refills: 0 | Status: SHIPPED | OUTPATIENT
Start: 2019-09-05 | End: 2019-12-05 | Stop reason: SDUPTHER

## 2019-09-05 RX ORDER — BUPROPION HYDROCHLORIDE 150 MG/1
150 TABLET ORAL DAILY
Qty: 90 TABLET | Refills: 0 | Status: SHIPPED | OUTPATIENT
Start: 2019-09-05 | End: 2019-12-05 | Stop reason: SDUPTHER

## 2019-09-05 RX ORDER — LAMOTRIGINE 25 MG/1
50 TABLET ORAL DAILY
Qty: 180 TABLET | Refills: 0 | Status: SHIPPED | OUTPATIENT
Start: 2019-09-05 | End: 2019-12-05 | Stop reason: SDUPTHER

## 2019-09-05 NOTE — PSYCH
Patient ID: Rodrigo Anderson is a 59 y o  female  HPI ROS Appetite Changes and Sleep:     Review Of Systems:     Mood Normal   Thought Content Normal   General Hearing loss( chronic problem)   Gastrointestinal Normal   Neurological Normal        Laboratory Results: No results found for this or any previous visit  Subjective:    Seen for ADHD and bipolar disorder  She is in remission  She is happy about her oldest daughter finally realized that she needed rehab and has been sober  Sleeping and eating well      Objective: In remission  Mental status:  Appearance calm and cooperative , adequate hygiene and grooming and good eye contact    Mood euthymic   Affect affect was broad and affect appropriate    Speech Normal rate and Normal volume   Thought Processes coherent/organized   Hallucinations no hallucinations present    Thought Content no delusional thoughts verbalized   Abnormal Thoughts no suicidal thoughts  and no homicidal thoughts    Orientation A+O x 3   Memory function Not tested   Attention Span concentration intact   Intellect Not Formally Assessed   Insight Insight intact   Judgement judgment was intact   Muscle Strength Muscle strength and tone were normal and Normal gait    Language no difficulty naming common objects, no difficulty repeating a phrase  and no difficulty writing a sentence    Pain none       Assessment/Plan:       Diagnoses and all orders for this visit:    Bipolar affective disorder in remission ([facility] )  -     buPROPion (WELLBUTRIN XL) 150 mg 24 hr tablet; Take 1 tablet (150 mg total) by mouth daily  -     lamoTRIgine (LaMICtal) 25 mg tablet; Take 2 tablets (50 mg total) by mouth daily    Attention deficit hyperactivity disorder (ADHD), combined type  -     amphetamine-dextroamphetamine (ADDERALL XR) 20 MG 24 hr capsule; Take 2 capsules (40 mg total) by mouth dailyMax Daily Amount: 40 mg        1  Bipolar affective disorder in remission ([facility] )    2   Attention deficit hyperactivity disorder (ADHD), combined type        Treatment Recommendations- Risks Benefits    Same medications and return to the office in 3 months  Risks, Benefits And Possible Side Effects Of Medications:  Risks, benefits, and possible side effects of medications explained to patient and patient verbalizes understanding

## 2019-09-24 ENCOUNTER — TELEPHONE (OUTPATIENT)
Dept: PSYCHIATRY | Facility: CLINIC | Age: 64
End: 2019-09-24

## 2019-09-24 NOTE — TELEPHONE ENCOUNTER
Oscar Calabrese called would like to speak with you regarding [facility] for genetic testing           Contact# [phone]

## 2019-11-27 ENCOUNTER — TRANSCRIBE ORDERS (OUTPATIENT)
Dept: ADMINISTRATIVE | Facility: HOSPITAL | Age: 64
End: 2019-11-27

## 2019-11-27 ENCOUNTER — HOSPITAL ENCOUNTER (OUTPATIENT)
Dept: RADIOLOGY | Facility: HOSPITAL | Age: 64
Discharge: HOME | End: 2019-11-27
Payer: COMMERCIAL

## 2019-11-27 VITALS — BODY MASS INDEX: 26.68 KG/M2 | HEIGHT: 62 IN | WEIGHT: 145 LBS

## 2019-11-27 DIAGNOSIS — Z12.31 ENCOUNTER FOR SCREENING MAMMOGRAM FOR MALIGNANT NEOPLASM OF BREAST: Primary | ICD-10-CM

## 2019-11-27 DIAGNOSIS — Z12.31 ENCOUNTER FOR SCREENING MAMMOGRAM FOR MALIGNANT NEOPLASM OF BREAST: ICD-10-CM

## 2019-11-27 PROCEDURE — 77067 SCR MAMMO BI INCL CAD: CPT

## 2019-12-05 ENCOUNTER — OFFICE VISIT (OUTPATIENT)
Dept: PSYCHIATRY | Facility: CLINIC | Age: 64
End: 2019-12-05
Payer: COMMERCIAL

## 2019-12-05 DIAGNOSIS — F90.2 ATTENTION DEFICIT HYPERACTIVITY DISORDER (ADHD), COMBINED TYPE: ICD-10-CM

## 2019-12-05 DIAGNOSIS — F31.70 BIPOLAR AFFECTIVE DISORDER IN REMISSION (HCC): ICD-10-CM

## 2019-12-05 PROCEDURE — 99213 OFFICE O/P EST LOW 20 MIN: CPT | Performed by: PSYCHIATRY & NEUROLOGY

## 2019-12-05 RX ORDER — DEXTROAMPHETAMINE SACCHARATE, AMPHETAMINE ASPARTATE MONOHYDRATE, DEXTROAMPHETAMINE SULFATE AND AMPHETAMINE SULFATE 5; 5; 5; 5 MG/1; MG/1; MG/1; MG/1
40 CAPSULE, EXTENDED RELEASE ORAL DAILY
Qty: 180 CAPSULE | Refills: 0 | Status: SHIPPED | OUTPATIENT
Start: 2019-12-05 | End: 2020-03-31 | Stop reason: SDUPTHER

## 2019-12-05 RX ORDER — LAMOTRIGINE 25 MG/1
50 TABLET ORAL DAILY
Qty: 180 TABLET | Refills: 0 | Status: SHIPPED | OUTPATIENT
Start: 2019-12-05 | End: 2020-03-31 | Stop reason: SDUPTHER

## 2019-12-05 RX ORDER — BUPROPION HYDROCHLORIDE 150 MG/1
150 TABLET ORAL DAILY
Qty: 90 TABLET | Refills: 0 | Status: SHIPPED | OUTPATIENT
Start: 2019-12-05 | End: 2020-03-31 | Stop reason: SDUPTHER

## 2019-12-05 NOTE — PSYCH
Patient ID: Ryanne Sainz is a 59 y o  female  Review Of Systems:     Mood Euthymic   Thought Content Normal   General As HPI   Physical symptoms As Noted in HPI       Laboratory Results: No results found for this or any previous visit  Subjective:    Seen for bipolar /ADHD  Finally there is stability in her family   She does not have any particular complaints  She will turn 65 next month and is wondering where her life is headed  She is still  to her  and is thinking about finalizing divorce  She sees a psychotherapist      Objective:     Overall stable     Mental status:  Appearance calm and cooperative , adequate hygiene and grooming and good eye contact    Mood euthymic   Affect affect was broad   Speech Normal rate and Normal volume   Thought Processes coherent/organized   Hallucinations no hallucinations present    Thought Content no delusional thoughts verbalized   Abnormal Thoughts no suicidal thoughts  and no homicidal thoughts    Orientation A+O x 3       Assessment/Plan:       Diagnoses and all orders for this visit:    Attention deficit hyperactivity disorder (ADHD), combined type  -     amphetamine-dextroamphetamine (ADDERALL XR) 20 MG 24 hr capsule; Take 2 capsules (40 mg total) by mouth dailyMax Daily Amount: 40 mg    Bipolar affective disorder in remission (HCC)  -     buPROPion (WELLBUTRIN XL) 150 mg 24 hr tablet; Take 1 tablet (150 mg total) by mouth daily  -     lamoTRIgine (LaMICtal) 25 mg tablet; Take 2 tablets (50 mg total) by mouth daily        1  Attention deficit hyperactivity disorder (ADHD), combined type    2   Bipolar affective disorder in remission [facility])        Treatment Recommendations- Risks Benefits    Continue with same meds and return to the office in 3 months  Risks, Benefits And Possible Side Effects Of Medications:  Risks, benefits, and possible side effects of medications explained to patient and patient verbalizes understanding

## 2020-03-31 ENCOUNTER — TELEMEDICINE (OUTPATIENT)
Dept: PSYCHIATRY | Facility: CLINIC | Age: 65
End: 2020-03-31
Payer: MEDICARE

## 2020-03-31 DIAGNOSIS — F31.70 BIPOLAR AFFECTIVE DISORDER IN REMISSION (HCC): ICD-10-CM

## 2020-03-31 DIAGNOSIS — F90.2 ATTENTION DEFICIT HYPERACTIVITY DISORDER (ADHD), COMBINED TYPE: ICD-10-CM

## 2020-03-31 DIAGNOSIS — F51.01 PRIMARY INSOMNIA: Primary | ICD-10-CM

## 2020-03-31 PROCEDURE — 99441 PR PHYS/QHP TELEPHONE EVALUATION 5-10 MIN: CPT | Performed by: PSYCHIATRY & NEUROLOGY

## 2020-03-31 RX ORDER — ZOLPIDEM TARTRATE 10 MG/1
10 TABLET ORAL
Qty: 45 TABLET | Refills: 1 | Status: SHIPPED | OUTPATIENT
Start: 2020-03-31 | End: 2020-04-20 | Stop reason: SDUPTHER

## 2020-03-31 RX ORDER — ZOLPIDEM TARTRATE 10 MG/1
10 TABLET ORAL DAILY PRN
COMMUNITY
Start: 2015-01-12 | End: 2020-03-31 | Stop reason: SDUPTHER

## 2020-03-31 RX ORDER — LAMOTRIGINE 25 MG/1
50 TABLET ORAL DAILY
Qty: 180 TABLET | Refills: 0 | Status: SHIPPED | OUTPATIENT
Start: 2020-03-31 | End: 2020-04-20 | Stop reason: SDUPTHER

## 2020-03-31 RX ORDER — DEXTROAMPHETAMINE SACCHARATE, AMPHETAMINE ASPARTATE MONOHYDRATE, DEXTROAMPHETAMINE SULFATE AND AMPHETAMINE SULFATE 5; 5; 5; 5 MG/1; MG/1; MG/1; MG/1
40 CAPSULE, EXTENDED RELEASE ORAL DAILY
Qty: 180 CAPSULE | Refills: 0 | Status: SHIPPED | OUTPATIENT
Start: 2020-03-31 | End: 2020-04-20 | Stop reason: SDUPTHER

## 2020-03-31 RX ORDER — BUPROPION HYDROCHLORIDE 150 MG/1
150 TABLET ORAL DAILY
Qty: 90 TABLET | Refills: 0 | Status: SHIPPED | OUTPATIENT
Start: 2020-03-31 | End: 2020-04-20 | Stop reason: SDUPTHER

## 2020-03-31 NOTE — PSYCH
Virtual Brief Visit    Problem List Items Addressed This Visit     None          [unfilled]     Reason for visit is continuing care and pharmacotherapy  Encounter provider Nola Li MD    Provider located at [facility]      [unfilled]     After connecting through telephone, the patient was identified by name and date of birth  THE [facility] was informed that this is a telemedicine visit and that the visit is being conducted through telephone  My office door was closed  No one else was in the room  She acknowledged consent and understanding of privacy and security of the video platform  The patient has agreed to participate and understands they can discontinue the visit at any time  Patient is aware this is a billable service  Subjective  THE [facility] is a 72 y o  female   Holding her own  She is worried about being over 72 and diabetic with COVID19 pandemic  She occasionally needs to take Ambien which has been prescribed by her primary care physician for insomnia  Her concentration seems to be adequate  She has not had any psychotic symptoms  No past medical history on file  Past Surgical History:   Procedure Laterality Date    HYSTERECTOMY      @ age 50       Current Outpatient Medications   Medication Sig Dispense Refill    amphetamine-dextroamphetamine (ADDERALL XR) 20 MG 24 hr capsule Take 2 capsules (40 mg total) by mouth dailyMax Daily Amount: 40 mg 180 capsule 0    buPROPion (WELLBUTRIN XL) 150 mg 24 hr tablet Take 1 tablet (150 mg total) by mouth daily 90 tablet 0    lamoTRIgine (LaMICtal) 25 mg tablet Take 2 tablets (50 mg total) by mouth daily 180 tablet 0     No current facility-administered medications for this visit  Allergies not on file    Review of Systems  Anxiety-occasional insomnia    I spent10 minutes with the patient during this visit

## 2020-04-17 ENCOUNTER — TELEPHONE (OUTPATIENT)
Dept: PSYCHIATRY | Facility: CLINIC | Age: 65
End: 2020-04-17

## 2020-04-20 DIAGNOSIS — F51.01 PRIMARY INSOMNIA: ICD-10-CM

## 2020-04-20 DIAGNOSIS — F90.2 ATTENTION DEFICIT HYPERACTIVITY DISORDER (ADHD), COMBINED TYPE: ICD-10-CM

## 2020-04-20 DIAGNOSIS — F31.70 BIPOLAR AFFECTIVE DISORDER IN REMISSION (HCC): ICD-10-CM

## 2020-04-20 RX ORDER — ZOLPIDEM TARTRATE 10 MG/1
10 TABLET ORAL
Qty: 45 TABLET | Refills: 1 | Status: SHIPPED | OUTPATIENT
Start: 2020-04-20 | End: 2020-04-20 | Stop reason: SDUPTHER

## 2020-04-20 RX ORDER — BUPROPION HYDROCHLORIDE 150 MG/1
150 TABLET ORAL DAILY
Qty: 90 TABLET | Refills: 0 | Status: SHIPPED | OUTPATIENT
Start: 2020-04-20 | End: 2020-06-18 | Stop reason: SDUPTHER

## 2020-04-20 RX ORDER — LAMOTRIGINE 25 MG/1
50 TABLET ORAL DAILY
Qty: 180 TABLET | Refills: 0 | Status: SHIPPED | OUTPATIENT
Start: 2020-04-20 | End: 2020-04-20 | Stop reason: SDUPTHER

## 2020-04-20 RX ORDER — BUPROPION HYDROCHLORIDE 150 MG/1
150 TABLET ORAL DAILY
Qty: 90 TABLET | Refills: 0 | Status: SHIPPED | OUTPATIENT
Start: 2020-04-20 | End: 2020-04-20 | Stop reason: SDUPTHER

## 2020-04-20 RX ORDER — LAMOTRIGINE 25 MG/1
50 TABLET ORAL DAILY
Qty: 180 TABLET | Refills: 0 | Status: SHIPPED | OUTPATIENT
Start: 2020-04-20 | End: 2020-06-18 | Stop reason: SDUPTHER

## 2020-04-20 RX ORDER — DEXTROAMPHETAMINE SACCHARATE, AMPHETAMINE ASPARTATE MONOHYDRATE, DEXTROAMPHETAMINE SULFATE AND AMPHETAMINE SULFATE 5; 5; 5; 5 MG/1; MG/1; MG/1; MG/1
40 CAPSULE, EXTENDED RELEASE ORAL DAILY
Qty: 180 CAPSULE | Refills: 0 | Status: SHIPPED | OUTPATIENT
Start: 2020-04-20 | End: 2020-04-27 | Stop reason: SDUPTHER

## 2020-04-20 RX ORDER — DEXTROAMPHETAMINE SACCHARATE, AMPHETAMINE ASPARTATE MONOHYDRATE, DEXTROAMPHETAMINE SULFATE AND AMPHETAMINE SULFATE 5; 5; 5; 5 MG/1; MG/1; MG/1; MG/1
40 CAPSULE, EXTENDED RELEASE ORAL DAILY
Qty: 180 CAPSULE | Refills: 0 | Status: SHIPPED | OUTPATIENT
Start: 2020-04-20 | End: 2020-04-20 | Stop reason: SDUPTHER

## 2020-04-20 RX ORDER — ZOLPIDEM TARTRATE 10 MG/1
10 TABLET ORAL
Qty: 45 TABLET | Refills: 1 | Status: SHIPPED | OUTPATIENT
Start: 2020-04-20 | End: 2020-06-18 | Stop reason: SDUPTHER

## 2020-04-22 ENCOUNTER — TELEPHONE (OUTPATIENT)
Dept: PSYCHIATRY | Facility: CLINIC | Age: 65
End: 2020-04-22

## 2020-04-27 DIAGNOSIS — F90.2 ATTENTION DEFICIT HYPERACTIVITY DISORDER (ADHD), COMBINED TYPE: ICD-10-CM

## 2020-04-27 RX ORDER — DEXTROAMPHETAMINE SACCHARATE, AMPHETAMINE ASPARTATE MONOHYDRATE, DEXTROAMPHETAMINE SULFATE AND AMPHETAMINE SULFATE 5; 5; 5; 5 MG/1; MG/1; MG/1; MG/1
40 CAPSULE, EXTENDED RELEASE ORAL DAILY
Qty: 180 CAPSULE | Refills: 0 | Status: SHIPPED | OUTPATIENT
Start: 2020-04-27 | End: 2020-04-27 | Stop reason: SDUPTHER

## 2020-04-27 RX ORDER — DEXTROAMPHETAMINE SACCHARATE, AMPHETAMINE ASPARTATE MONOHYDRATE, DEXTROAMPHETAMINE SULFATE AND AMPHETAMINE SULFATE 5; 5; 5; 5 MG/1; MG/1; MG/1; MG/1
40 CAPSULE, EXTENDED RELEASE ORAL DAILY
Qty: 180 CAPSULE | Refills: 0 | Status: SHIPPED | OUTPATIENT
Start: 2020-04-27 | End: 2020-05-21 | Stop reason: SDUPTHER

## 2020-04-29 ENCOUNTER — TELEPHONE (OUTPATIENT)
Dept: PSYCHIATRY | Facility: CLINIC | Age: 65
End: 2020-04-29

## 2020-05-20 DIAGNOSIS — F90.2 ATTENTION DEFICIT HYPERACTIVITY DISORDER (ADHD), COMBINED TYPE: ICD-10-CM

## 2020-05-20 RX ORDER — DEXTROAMPHETAMINE SACCHARATE, AMPHETAMINE ASPARTATE MONOHYDRATE, DEXTROAMPHETAMINE SULFATE AND AMPHETAMINE SULFATE 5; 5; 5; 5 MG/1; MG/1; MG/1; MG/1
40 CAPSULE, EXTENDED RELEASE ORAL DAILY
Qty: 180 CAPSULE | Refills: 0 | OUTPATIENT
Start: 2020-05-20

## 2020-05-21 DIAGNOSIS — F90.2 ATTENTION DEFICIT HYPERACTIVITY DISORDER (ADHD), COMBINED TYPE: ICD-10-CM

## 2020-05-21 RX ORDER — DEXTROAMPHETAMINE SACCHARATE, AMPHETAMINE ASPARTATE MONOHYDRATE, DEXTROAMPHETAMINE SULFATE AND AMPHETAMINE SULFATE 5; 5; 5; 5 MG/1; MG/1; MG/1; MG/1
40 CAPSULE, EXTENDED RELEASE ORAL DAILY
Qty: 180 CAPSULE | Refills: 0 | Status: SHIPPED | OUTPATIENT
Start: 2020-05-21 | End: 2020-05-22 | Stop reason: SDUPTHER

## 2020-05-21 RX ORDER — DEXTROAMPHETAMINE SACCHARATE, AMPHETAMINE ASPARTATE MONOHYDRATE, DEXTROAMPHETAMINE SULFATE AND AMPHETAMINE SULFATE 5; 5; 5; 5 MG/1; MG/1; MG/1; MG/1
40 CAPSULE, EXTENDED RELEASE ORAL DAILY
Qty: 180 CAPSULE | Refills: 0 | Status: CANCELLED | OUTPATIENT
Start: 2020-05-21

## 2020-05-22 ENCOUNTER — TELEPHONE (OUTPATIENT)
Dept: OTHER | Facility: OTHER | Age: 65
End: 2020-05-22

## 2020-05-22 DIAGNOSIS — F90.2 ATTENTION DEFICIT HYPERACTIVITY DISORDER (ADHD), COMBINED TYPE: ICD-10-CM

## 2020-05-22 RX ORDER — DEXTROAMPHETAMINE SACCHARATE, AMPHETAMINE ASPARTATE MONOHYDRATE, DEXTROAMPHETAMINE SULFATE AND AMPHETAMINE SULFATE 5; 5; 5; 5 MG/1; MG/1; MG/1; MG/1
40 CAPSULE, EXTENDED RELEASE ORAL DAILY
Qty: 180 CAPSULE | Refills: 0 | Status: SHIPPED | OUTPATIENT
Start: 2020-05-22 | End: 2020-05-27 | Stop reason: SDUPTHER

## 2020-05-26 ENCOUNTER — TELEPHONE (OUTPATIENT)
Dept: PSYCHIATRY | Facility: CLINIC | Age: 65
End: 2020-05-26

## 2020-05-27 DIAGNOSIS — F90.2 ATTENTION DEFICIT HYPERACTIVITY DISORDER (ADHD), COMBINED TYPE: ICD-10-CM

## 2020-05-27 RX ORDER — DEXTROAMPHETAMINE SACCHARATE, AMPHETAMINE ASPARTATE MONOHYDRATE, DEXTROAMPHETAMINE SULFATE AND AMPHETAMINE SULFATE 5; 5; 5; 5 MG/1; MG/1; MG/1; MG/1
40 CAPSULE, EXTENDED RELEASE ORAL DAILY
Qty: 180 CAPSULE | Refills: 0 | Status: SHIPPED | OUTPATIENT
Start: 2020-05-27 | End: 2020-06-18 | Stop reason: CLARIF

## 2020-06-18 ENCOUNTER — TELEMEDICINE (OUTPATIENT)
Dept: PSYCHIATRY | Facility: CLINIC | Age: 65
End: 2020-06-18
Payer: MEDICARE

## 2020-06-18 DIAGNOSIS — F51.01 PRIMARY INSOMNIA: ICD-10-CM

## 2020-06-18 DIAGNOSIS — F90.2 ATTENTION DEFICIT HYPERACTIVITY DISORDER (ADHD), COMBINED TYPE: Primary | ICD-10-CM

## 2020-06-18 DIAGNOSIS — F31.70 BIPOLAR AFFECTIVE DISORDER IN REMISSION (HCC): ICD-10-CM

## 2020-06-18 PROCEDURE — 99214 OFFICE O/P EST MOD 30 MIN: CPT | Performed by: PSYCHIATRY & NEUROLOGY

## 2020-06-18 RX ORDER — ZOLPIDEM TARTRATE 10 MG/1
10 TABLET ORAL
Qty: 45 TABLET | Refills: 1 | Status: SHIPPED | OUTPATIENT
Start: 2020-06-18 | End: 2020-09-21 | Stop reason: SDUPTHER

## 2020-06-18 RX ORDER — BUPROPION HYDROCHLORIDE 150 MG/1
150 TABLET ORAL DAILY
Qty: 90 TABLET | Refills: 0 | Status: SHIPPED | OUTPATIENT
Start: 2020-06-18 | End: 2020-09-21 | Stop reason: SDUPTHER

## 2020-06-18 RX ORDER — LAMOTRIGINE 25 MG/1
50 TABLET ORAL DAILY
Qty: 180 TABLET | Refills: 0 | Status: SHIPPED | OUTPATIENT
Start: 2020-06-18 | End: 2020-09-21 | Stop reason: SDUPTHER

## 2020-06-18 RX ORDER — METHYLPHENIDATE HYDROCHLORIDE 10 MG/1
10 TABLET ORAL
Qty: 180 TABLET | Refills: 0 | Status: CANCELLED | OUTPATIENT
Start: 2020-06-18

## 2020-06-18 RX ORDER — METHYLPHENIDATE HYDROCHLORIDE 10 MG/1
10 TABLET ORAL
Qty: 60 TABLET | Refills: 0 | Status: SHIPPED | OUTPATIENT
Start: 2020-06-18 | End: 2020-07-29 | Stop reason: SDUPTHER

## 2020-07-29 DIAGNOSIS — F90.2 ATTENTION DEFICIT HYPERACTIVITY DISORDER (ADHD), COMBINED TYPE: ICD-10-CM

## 2020-07-29 RX ORDER — METHYLPHENIDATE HYDROCHLORIDE 10 MG/1
10 TABLET ORAL
Qty: 60 TABLET | Refills: 0 | Status: SHIPPED | OUTPATIENT
Start: 2020-07-29 | End: 2020-08-18 | Stop reason: SDUPTHER

## 2020-08-18 DIAGNOSIS — F90.2 ATTENTION DEFICIT HYPERACTIVITY DISORDER (ADHD), COMBINED TYPE: ICD-10-CM

## 2020-08-19 RX ORDER — METHYLPHENIDATE HYDROCHLORIDE 10 MG/1
10 TABLET ORAL
Qty: 60 TABLET | Refills: 0 | Status: SHIPPED | OUTPATIENT
Start: 2020-08-19 | End: 2020-09-21 | Stop reason: SDUPTHER

## 2020-08-20 ENCOUNTER — TELEPHONE (OUTPATIENT)
Dept: PSYCHIATRY | Facility: CLINIC | Age: 65
End: 2020-08-20

## 2020-08-20 NOTE — TELEPHONE ENCOUNTER
Attempted to call Ronald Hinds to inform her that   [facility] has filled her script  However did not leave message on her voice mail

## 2020-09-21 ENCOUNTER — OFFICE VISIT (OUTPATIENT)
Dept: PSYCHIATRY | Facility: CLINIC | Age: 65
End: 2020-09-21
Payer: MEDICARE

## 2020-09-21 VITALS — BODY MASS INDEX: 26.68 KG/M2 | HEIGHT: 62 IN | RESPIRATION RATE: 18 BRPM | WEIGHT: 145 LBS

## 2020-09-21 DIAGNOSIS — F51.01 PRIMARY INSOMNIA: ICD-10-CM

## 2020-09-21 DIAGNOSIS — F90.2 ATTENTION DEFICIT HYPERACTIVITY DISORDER (ADHD), COMBINED TYPE: Primary | ICD-10-CM

## 2020-09-21 DIAGNOSIS — F31.70 BIPOLAR AFFECTIVE DISORDER IN REMISSION (HCC): ICD-10-CM

## 2020-09-21 PROCEDURE — 99214 OFFICE O/P EST MOD 30 MIN: CPT | Performed by: PHYSICIAN ASSISTANT

## 2020-09-21 RX ORDER — ZOLPIDEM TARTRATE 5 MG/1
5 TABLET ORAL
Qty: 30 TABLET | Refills: 2 | Status: SHIPPED | OUTPATIENT
Start: 2020-09-21 | End: 2020-10-02 | Stop reason: SDUPTHER

## 2020-09-21 RX ORDER — ESTRADIOL 10 UG/1
INSERT VAGINAL
COMMUNITY
Start: 2020-04-06 | End: 2022-04-05 | Stop reason: SDUPTHER

## 2020-09-21 RX ORDER — LAMOTRIGINE 25 MG/1
50 TABLET ORAL DAILY
Qty: 180 TABLET | Refills: 1 | Status: SHIPPED | OUTPATIENT
Start: 2020-09-21 | End: 2020-10-02 | Stop reason: SDUPTHER

## 2020-09-21 RX ORDER — DULAGLUTIDE 1.5 MG/.5ML
INJECTION, SOLUTION SUBCUTANEOUS
COMMUNITY
Start: 2020-07-18

## 2020-09-21 RX ORDER — LEVOTHYROXINE SODIUM 88 UG/1
TABLET ORAL
COMMUNITY
Start: 2020-06-20

## 2020-09-21 RX ORDER — METHYLPHENIDATE HYDROCHLORIDE 10 MG/1
10 TABLET ORAL
Qty: 60 TABLET | Refills: 0 | Status: SHIPPED | OUTPATIENT
Start: 2020-09-21 | End: 2020-09-21 | Stop reason: SDUPTHER

## 2020-09-21 RX ORDER — ATORVASTATIN CALCIUM 40 MG/1
TABLET, FILM COATED ORAL
COMMUNITY
Start: 2020-06-20 | End: 2022-04-05 | Stop reason: ALTCHOICE

## 2020-09-21 RX ORDER — CHOLECALCIFEROL (VITAMIN D3) 1250 MCG
1 CAPSULE ORAL WEEKLY
COMMUNITY
Start: 2020-06-24 | End: 2021-08-02

## 2020-09-21 RX ORDER — BUPROPION HYDROCHLORIDE 150 MG/1
150 TABLET ORAL DAILY
Qty: 90 TABLET | Refills: 1 | Status: SHIPPED | OUTPATIENT
Start: 2020-09-21 | End: 2020-10-02 | Stop reason: SDUPTHER

## 2020-09-21 RX ORDER — METHYLPHENIDATE HYDROCHLORIDE 10 MG/1
10 TABLET ORAL
Qty: 60 TABLET | Refills: 0 | Status: SHIPPED | OUTPATIENT
Start: 2020-09-21 | End: 2020-10-02 | Stop reason: SDUPTHER

## 2020-09-21 RX ORDER — ZOLPIDEM TARTRATE 10 MG/1
10 TABLET ORAL
Qty: 45 TABLET | Refills: 1 | Status: SHIPPED | OUTPATIENT
Start: 2020-09-21 | End: 2020-09-21 | Stop reason: SDUPTHER

## 2020-09-21 RX ORDER — INSULIN GLARGINE 100 [IU]/ML
INJECTION, SOLUTION SUBCUTANEOUS
COMMUNITY
Start: 2020-06-17

## 2020-09-21 NOTE — PSYCH
PROGRESS NOTE        [facility]      Name and Date of Birth:  María spencer 72 y o  1955    Date of Visit: 09/21/20    SUBJECTIVE:  Patient seen for follow-up of bipolar disorder, insomnia and adult ADD  Patient reports that she has been struggling with organization and feels as though things have been piling up around house  Otherwise she reports that she has been doing well  Methylphenidate has been helping with her focus and concentration  Reports that her sleep has been adequate and good appetite  No significant depressive episodes and anxiety has been manageable  Reports that she did have a challenging couple years due to the death of her younger sister, her daughter two years ago and a close friend this spring  Overall appears to be grieving appropriately  She has been spending more time at home due to restrictions with COVID  Medically she is stable  Medication list was reviewed and updated  She denies suicidal ideation, intent or plan at present, has no suicidal ideation, intent or plan at present  She denies any auditory hallucinations and visual hallucinations, denies any other delusional thinking, denies any delusional thinking  She denies any side effects from medications    HPI ROS Appetite Changes and Sleep: normal appetite, normal sleep    Review Of Systems:      Constitutional Negative   ENT Negative   Cardiovascular Negative   Respiratory Negative   Gastrointestinal Negative   Genitourinary Negative   Musculoskeletal Negative   Integumentary Negative   Neurological Negative   Endocrine Negative   Other Symptoms Negative and None       Laboratory Results: No results found for this or any previous visit      Substance Abuse History:    Social History     Substance and Sexual Activity   Drug Use Not on file       Family Psychiatric History:     Family History   Problem Relation Age of Onset    No Known Problems Mother  No Known Problems Father     Multiple myeloma Sister     No Known Problems Daughter     Breast cancer Maternal Grandmother         under age 48   Exradha Mustafa No Known Problems Maternal Grandfather     No Known Problems Paternal Grandmother     No Known Problems Paternal Grandfather     No Known Problems Son     No Known Problems Daughter     No Known Problems Sister     No Known Problems Brother     No Known Problems Brother     No Known Problems Brother     No Known Problems Maternal Aunt     No Known Problems Maternal Aunt     No Known Problems Paternal Aunt        The following portions of the patient's history were reviewed and updated as appropriate: past family history, past medical history, past social history, past surgical history and problem list     Social History     Socioeconomic History    Marital status: /Civil Union     Spouse name: Not on file    Number of children: Not on file    Years of education: Not on file    Highest education level: Not on file   Occupational History    Not on file   Social Needs    Financial resource strain: Not on file    Food insecurity     Worry: Not on file     Inability: Not on file    Transportation needs     Medical: Not on file     Non-medical: Not on file   Tobacco Use    Smoking status: Not on file   Substance and Sexual Activity    Alcohol use: Not on file    Drug use: Not on file    Sexual activity: Not on file   Lifestyle    Physical activity     Days per week: Not on file     Minutes per session: Not on file    Stress: Not on file   Relationships    Social connections     Talks on phone: Not on file     Gets together: Not on file     Attends Druze service: Not on file     Active member of club or organization: Not on file     Attends meetings of clubs or organizations: Not on file     Relationship status: Not on file    Intimate partner violence     Fear of current or ex partner: Not on file     Emotionally abused: Not on file Physically abused: Not on file     Forced sexual activity: Not on file   Other Topics Concern    Not on file   Social History Narrative    Not on file     Social History     Social History Narrative    Not on file        Social History     Tobacco History     Smoking Status  Never Assessed    Smokeless Tobacco Use  Unknown          Alcohol History     Alcohol Use Status  Not Asked          Drug Use     Drug Use Status  Not Asked          Sexual Activity     Sexually Active  Not Asked          Activities of Daily Living    Not Asked                     OBJECTIVE:     Mental Status Evaluation:    Appearance age appropriate, casually dressed   Behavior pleasant, cooperative   Speech normal volume, normal pitch, somewhat pressured   Mood Euthymic   Affect Congruent   Thought Processes logical   Associations intact associations   Thought Content normal   Perceptual Disturbances: none   Abnormal Thoughts  Risk Potential Suicidal ideation - None  Homicidal ideation - None  Potential for aggression - No   Orientation oriented to person, place, time/date and situation   Memory recent and remote memory grossly intact   Cosciousness alert and awake   Attention Span attention span and concentration are age appropriate   Intellect Appears to be of Average Intelligence   Insight age appropriate    Judgement good    Muscle Strength and  Gait muscle strength and tone were normal   Language no difficulty naming common objects   Fund of Knowledge displays adequate knowledge of current events   Pain none   Pain Scale 0       Assessment/Plan:       Diagnoses and all orders for this visit:    Attention deficit hyperactivity disorder (ADHD), combined type    Bipolar affective disorder in remission [facility])    Primary insomnia          Treatment Recommendations/Precautions:  Supportive psychotherapy focused on residual stressors including organizational issues and remedies  Methylphenidate 10 mg b i d    Wellbutrin  mg daily  Lamotrigine 25 mg two per day  Ambien 10 mg p o  Q h s  P r n  Follow-up three months  Continue current medications    Risks/Benefits      Risks, Benefits And Possible Side Effects Of Medications:    Risks, benefits, and possible side effects of medications explained to patient and patient verbalizes understanding and agreement for treatment      Controlled Medication Discussion:     Taking as prescribed    Psychotherapy Provided:     Individual psychotherapy provided: No

## 2020-09-21 NOTE — BH TREATMENT PLAN
TREATMENT PLAN (Medication Management Only)        [facility]    Name and Date of Birth:  María spencer 72 y o  1955  Date of Treatment Plan: September 21, 2020  Diagnosis/Diagnoses:    1  Attention deficit hyperactivity disorder (ADHD), combined type    2  Bipolar affective disorder in remission ([facility] [address] )    3  Primary insomnia      Strengths/Personal Resources for Self-Care: supportive family, supportive friends, taking medications as prescribed  Area/Areas of need (in own words): "Organization"  1  Long Term Goal: Mood stability  Target Date:3 months - 12/21/2020  Person/Persons responsible for completion of goal: [facility]  2  Short Term Objective (s) - How will we reach this goal?:   A  Provider new recommended medication/dosage changes and/or continue medication(s): continue current medications as prescribed  B  Coping skills focused on residual stressors and organization issues  C  N/A  Target Date:3 months - 12/21/2020  Person/Persons Responsible for Completion of Goal: Sofia  Progress Towards Goals: continuing treatment  Treatment Modality: medication management every 3 months  Review due 180 days from date of this plan: 6 months - 3/21/2021  Expected length of service: ongoing treatment  My Physician/PA/NP and I have developed this plan together and I agree to work on the goals and objectives  I understand the treatment goals that were developed for my treatment

## 2020-10-01 ENCOUNTER — TELEPHONE (OUTPATIENT)
Dept: PSYCHIATRY | Facility: CLINIC | Age: 65
End: 2020-10-01

## 2020-10-02 DIAGNOSIS — F31.70 BIPOLAR AFFECTIVE DISORDER IN REMISSION (HCC): ICD-10-CM

## 2020-10-02 DIAGNOSIS — F51.01 PRIMARY INSOMNIA: ICD-10-CM

## 2020-10-02 DIAGNOSIS — F90.2 ATTENTION DEFICIT HYPERACTIVITY DISORDER (ADHD), COMBINED TYPE: ICD-10-CM

## 2020-10-02 RX ORDER — BUPROPION HYDROCHLORIDE 150 MG/1
150 TABLET ORAL DAILY
Qty: 90 TABLET | Refills: 1 | Status: SHIPPED | OUTPATIENT
Start: 2020-10-02 | End: 2020-10-08 | Stop reason: SDUPTHER

## 2020-10-02 RX ORDER — METHYLPHENIDATE HYDROCHLORIDE 10 MG/1
10 TABLET ORAL
Qty: 60 TABLET | Refills: 0 | Status: SHIPPED | OUTPATIENT
Start: 2020-10-02 | End: 2020-10-08 | Stop reason: SDUPTHER

## 2020-10-02 RX ORDER — LAMOTRIGINE 25 MG/1
50 TABLET ORAL DAILY
Qty: 180 TABLET | Refills: 1 | Status: SHIPPED | OUTPATIENT
Start: 2020-10-02 | End: 2020-10-08 | Stop reason: SDUPTHER

## 2020-10-02 RX ORDER — ZOLPIDEM TARTRATE 5 MG/1
5 TABLET ORAL
Qty: 30 TABLET | Refills: 2 | Status: SHIPPED | OUTPATIENT
Start: 2020-10-02 | End: 2020-12-18

## 2020-10-08 DIAGNOSIS — F90.2 ATTENTION DEFICIT HYPERACTIVITY DISORDER (ADHD), COMBINED TYPE: ICD-10-CM

## 2020-10-08 DIAGNOSIS — F31.70 BIPOLAR AFFECTIVE DISORDER IN REMISSION (HCC): ICD-10-CM

## 2020-10-09 RX ORDER — BUPROPION HYDROCHLORIDE 150 MG/1
150 TABLET ORAL DAILY
Qty: 90 TABLET | Refills: 1 | Status: SHIPPED | OUTPATIENT
Start: 2020-10-09 | End: 2021-05-18 | Stop reason: SDUPTHER

## 2020-10-09 RX ORDER — LAMOTRIGINE 25 MG/1
50 TABLET ORAL DAILY
Qty: 180 TABLET | Refills: 1 | Status: SHIPPED | OUTPATIENT
Start: 2020-10-09 | End: 2021-05-18 | Stop reason: SDUPTHER

## 2020-10-09 RX ORDER — METHYLPHENIDATE HYDROCHLORIDE 10 MG/1
10 TABLET ORAL
Qty: 60 TABLET | Refills: 0 | Status: SHIPPED | OUTPATIENT
Start: 2020-10-09 | End: 2020-12-21 | Stop reason: SDUPTHER

## 2020-11-09 ENCOUNTER — TRANSCRIBE ORDERS (OUTPATIENT)
Dept: ADMINISTRATIVE | Facility: HOSPITAL | Age: 65
End: 2020-11-09

## 2020-11-09 DIAGNOSIS — Z12.31 ENCOUNTER FOR SCREENING MAMMOGRAM FOR MALIGNANT NEOPLASM OF BREAST: Primary | ICD-10-CM

## 2020-12-15 ENCOUNTER — TELEPHONE (OUTPATIENT)
Dept: PSYCHIATRY | Facility: CLINIC | Age: 65
End: 2020-12-15

## 2020-12-15 NOTE — TELEPHONE ENCOUNTER
Fermin Carnes sees dr Jose Aw,  Her most recent script for ativan was sent in error  It should be 10mg  Not 5mg  Are you able to send a new script to [facility] for her?

## 2020-12-18 DIAGNOSIS — F51.01 PRIMARY INSOMNIA: ICD-10-CM

## 2020-12-18 RX ORDER — ZOLPIDEM TARTRATE 10 MG/1
10 TABLET ORAL
Qty: 45 TABLET | Refills: 1 | Status: SHIPPED | OUTPATIENT
Start: 2020-12-18 | End: 2021-08-02 | Stop reason: ALTCHOICE

## 2020-12-21 ENCOUNTER — TELEMEDICINE (OUTPATIENT)
Dept: PSYCHIATRY | Facility: CLINIC | Age: 65
End: 2020-12-21
Payer: MEDICARE

## 2020-12-21 DIAGNOSIS — F90.2 ATTENTION DEFICIT HYPERACTIVITY DISORDER (ADHD), COMBINED TYPE: ICD-10-CM

## 2020-12-21 DIAGNOSIS — F31.70 BIPOLAR AFFECTIVE DISORDER IN REMISSION (HCC): Primary | ICD-10-CM

## 2020-12-21 DIAGNOSIS — F51.01 PRIMARY INSOMNIA: ICD-10-CM

## 2020-12-21 PROCEDURE — 99442 PR PHYS/QHP TELEPHONE EVALUATION 11-20 MIN: CPT | Performed by: PHYSICIAN ASSISTANT

## 2020-12-21 RX ORDER — METHYLPHENIDATE HYDROCHLORIDE 10 MG/1
10 TABLET ORAL
Qty: 60 TABLET | Refills: 0 | Status: SHIPPED | OUTPATIENT
Start: 2020-12-21 | End: 2021-05-18 | Stop reason: ALTCHOICE

## 2020-12-21 RX ORDER — METHYLPHENIDATE HYDROCHLORIDE 10 MG/1
10 TABLET ORAL
Qty: 60 TABLET | Refills: 0 | Status: SHIPPED | OUTPATIENT
Start: 2020-12-21 | End: 2020-12-21 | Stop reason: SDUPTHER

## 2021-03-10 ENCOUNTER — TELEPHONE (OUTPATIENT)
Dept: PSYCHIATRY | Facility: CLINIC | Age: 66
End: 2021-03-10

## 2021-03-10 DIAGNOSIS — Z23 ENCOUNTER FOR IMMUNIZATION: ICD-10-CM

## 2021-03-10 NOTE — TELEPHONE ENCOUNTER
Sofia called and LM on [employer]  She needs to reschedule her appointmetn but would like to see another psychiatrist  Not mary MEYER  [phone]

## 2021-03-10 NOTE — TELEPHONE ENCOUNTER
Patient called and left a message at 9:22 requesting to transfer to the [facility]

## 2021-04-13 ENCOUNTER — HOSPITAL ENCOUNTER (OUTPATIENT)
Dept: RADIOLOGY | Age: 66
Discharge: HOME | End: 2021-04-13
Payer: MEDICARE

## 2021-04-13 VITALS — WEIGHT: 143 LBS | BODY MASS INDEX: 26.31 KG/M2 | HEIGHT: 62 IN

## 2021-04-13 DIAGNOSIS — Z12.31 ENCOUNTER FOR SCREENING MAMMOGRAM FOR MALIGNANT NEOPLASM OF BREAST: ICD-10-CM

## 2021-04-13 PROCEDURE — 77067 SCR MAMMO BI INCL CAD: CPT

## 2021-04-13 PROCEDURE — 77063 BREAST TOMOSYNTHESIS BI: CPT
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
## 2021-05-10 ENCOUNTER — TELEPHONE (OUTPATIENT)
Dept: PSYCHIATRY | Facility: CLINIC | Age: 66
End: 2021-05-10

## 2021-05-10 NOTE — TELEPHONE ENCOUNTER
Left message to schedule follow up appointment otherwise would have to reschedule initial appointment

## 2021-05-10 NOTE — TELEPHONE ENCOUNTER
----- Message from Ashwini Kirby MD sent at 5/8/2021 11:30 PM EDT -----  Regarding: Need sto have 4 week appointment  This patient is a new patient for me (transfer)  She has to have 4 week appointment after her initial evaluation, otherwise her New Patient appointment would have to be rescheduled  Thank you

## 2021-05-18 ENCOUNTER — OFFICE VISIT (OUTPATIENT)
Dept: PSYCHIATRY | Facility: CLINIC | Age: 66
End: 2021-05-18
Payer: MEDICARE

## 2021-05-18 VITALS — WEIGHT: 139 LBS | BODY MASS INDEX: 25.58 KG/M2 | HEIGHT: 62 IN

## 2021-05-18 DIAGNOSIS — F41.1 GAD (GENERALIZED ANXIETY DISORDER): Chronic | ICD-10-CM

## 2021-05-18 DIAGNOSIS — F90.2 ATTENTION DEFICIT HYPERACTIVITY DISORDER (ADHD), COMBINED TYPE: Chronic | ICD-10-CM

## 2021-05-18 DIAGNOSIS — G47.09 OTHER INSOMNIA: Chronic | ICD-10-CM

## 2021-05-18 DIAGNOSIS — F31.76 BIPOLAR I DISORDER, MOST RECENT EPISODE DEPRESSED, IN FULL REMISSION (HCC): Primary | Chronic | ICD-10-CM

## 2021-05-18 PROBLEM — K64.8 OTHER HEMORRHOIDS: Status: ACTIVE | Noted: 2019-08-01

## 2021-05-18 PROBLEM — G56.21 ULNAR NERVE ENTRAPMENT AT RIGHT ELBOW: Status: ACTIVE | Noted: 2021-04-26

## 2021-05-18 PROBLEM — H91.90 HEARING LOSS: Status: ACTIVE | Noted: 2019-08-01

## 2021-05-18 PROBLEM — M85.80 OSTEOPENIA: Status: ACTIVE | Noted: 2019-12-12

## 2021-05-18 PROBLEM — E73.9 LACTOSE INTOLERANCE: Status: ACTIVE | Noted: 2021-01-11

## 2021-05-18 PROBLEM — M70.20 OLECRANON BURSITIS: Status: ACTIVE | Noted: 2021-05-18

## 2021-05-18 PROBLEM — I10 ESSENTIAL HYPERTENSION: Status: ACTIVE | Noted: 2019-08-05

## 2021-05-18 PROBLEM — R94.130 ABNORMAL NERVE CONDUCTION STUDIES: Status: ACTIVE | Noted: 2021-04-26

## 2021-05-18 PROBLEM — Z98.890 S/P ENDOSCOPIC CARPAL TUNNEL RELEASE: Status: ACTIVE | Noted: 2021-05-13

## 2021-05-18 PROBLEM — D75.839 THROMBOCYTOSIS: Status: ACTIVE | Noted: 2019-08-01

## 2021-05-18 PROBLEM — G56.02 CARPAL TUNNEL SYNDROME OF LEFT WRIST: Status: ACTIVE | Noted: 2021-04-26

## 2021-05-18 PROBLEM — Z96.1 BILATERAL PSEUDOPHAKIA: Status: ACTIVE | Noted: 2020-06-10

## 2021-05-18 PROBLEM — M51.26 DISPLACEMENT OF LUMBAR INTERVERTEBRAL DISC WITHOUT MYELOPATHY: Status: ACTIVE | Noted: 2021-05-18

## 2021-05-18 PROBLEM — L82.1 SEBORRHEIC KERATOSIS: Status: ACTIVE | Noted: 2020-06-25

## 2021-05-18 PROBLEM — E10.9 TYPE I DIABETES MELLITUS, WELL CONTROLLED (HCC): Status: ACTIVE | Noted: 2021-04-26

## 2021-05-18 PROBLEM — N89.8 VAGINAL DRYNESS: Status: ACTIVE | Noted: 2019-06-06

## 2021-05-18 PROBLEM — R20.2 PARESTHESIA OF BOTH HANDS: Status: ACTIVE | Noted: 2021-03-23

## 2021-05-18 PROBLEM — H43.819 POSTERIOR VITREOUS DETACHMENT: Status: ACTIVE | Noted: 2020-06-10

## 2021-05-18 PROBLEM — M65.4 RADIAL STYLOID TENOSYNOVITIS: Status: ACTIVE | Noted: 2021-05-18

## 2021-05-18 PROCEDURE — 99214 OFFICE O/P EST MOD 30 MIN: CPT | Performed by: PSYCHIATRY & NEUROLOGY

## 2021-05-18 RX ORDER — BUPROPION HYDROCHLORIDE 150 MG/1
150 TABLET ORAL DAILY
Qty: 90 TABLET | Refills: 0 | Status: SHIPPED | OUTPATIENT
Start: 2021-05-18 | End: 2021-08-02 | Stop reason: SDUPTHER

## 2021-05-18 RX ORDER — LAMOTRIGINE 25 MG/1
50 TABLET ORAL DAILY
Qty: 180 TABLET | Refills: 0 | Status: SHIPPED | OUTPATIENT
Start: 2021-05-18 | End: 2021-06-10 | Stop reason: SDUPTHER

## 2021-05-18 NOTE — BH TREATMENT PLAN
TREATMENT PLAN (Medication Management Only)        [facility]    Name/Date of Birth/Tammie Ritter 77 y o  1955 MRN: [id number]  Date of Treatment Plan: May 18, 2021  Diagnosis/Diagnoses:   1  Bipolar I disorder, most recent episode depressed, in full remission ([facility] [address] )    2  RAMEZ (generalized anxiety disorder)    3  Attention deficit hyperactivity disorder (ADHD), combined type    4  Other insomnia      Strengths/Personal Resources for Self-Care: "I am open, I want to be healthy  I want to be a better person"  Area/Areas of need (in own words): "try to understand what needs to be changed"  1  Long Term Goal:   maintain control of anxiety, maintain mood stability  Target Date: 4 weeks - 6/15/2021  Person/Persons responsible for completion of goal: Sofia  2  Short Term Objective (s) - How will we reach this goal?:   A  Provider new recommended medication/dosage changes and/or continue medication(s): discontinue Ritalin, continue all other medications (Wellbutrin XL, Lamictal and Ambien)  B   N/A   C   N/A  Target Date: 4 weeks - 6/15/2021  Person/Persons Responsible for Completion of Goal: Sofia   Progress Towards Goals: initiating treatment  Treatment Modality: medication management every 4 weeks  Review due 180 days from date of this plan: 6 months - 11/18/2021  Expected length of service: ongoing treatment unless revised  My Physician/PA/NP and I have developed this plan together and I agree to work on the goals and objectives  I understand the treatment goals that were developed for my treatment    Electronic Signatures: on file (unless signed below)    Franco Moya MD 05/18/21
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
## 2021-06-10 ENCOUNTER — OFFICE VISIT (OUTPATIENT)
Dept: PSYCHIATRY | Facility: CLINIC | Age: 66
End: 2021-06-10
Payer: MEDICARE

## 2021-06-10 VITALS — BODY MASS INDEX: 26.13 KG/M2 | HEIGHT: 62 IN | WEIGHT: 142 LBS

## 2021-06-10 DIAGNOSIS — F31.76 BIPOLAR I DISORDER, MOST RECENT EPISODE DEPRESSED, IN FULL REMISSION (HCC): Primary | Chronic | ICD-10-CM

## 2021-06-10 DIAGNOSIS — F90.2 ATTENTION DEFICIT HYPERACTIVITY DISORDER (ADHD), COMBINED TYPE: Chronic | ICD-10-CM

## 2021-06-10 DIAGNOSIS — F41.1 GAD (GENERALIZED ANXIETY DISORDER): Chronic | ICD-10-CM

## 2021-06-10 DIAGNOSIS — G47.09 OTHER INSOMNIA: Chronic | ICD-10-CM

## 2021-06-10 PROCEDURE — 90833 PSYTX W PT W E/M 30 MIN: CPT | Performed by: PSYCHIATRY & NEUROLOGY

## 2021-06-10 PROCEDURE — 99214 OFFICE O/P EST MOD 30 MIN: CPT | Performed by: PSYCHIATRY & NEUROLOGY

## 2021-06-10 RX ORDER — LAMOTRIGINE 100 MG/1
100 TABLET ORAL DAILY
Qty: 90 TABLET | Refills: 0 | Status: SHIPPED | OUTPATIENT
Start: 2021-06-10 | End: 2021-08-02 | Stop reason: SDUPTHER

## 2021-08-02 ENCOUNTER — OFFICE VISIT (OUTPATIENT)
Dept: PSYCHIATRY | Facility: CLINIC | Age: 66
End: 2021-08-02
Payer: MEDICARE

## 2021-08-02 VITALS — BODY MASS INDEX: 26.62 KG/M2 | HEIGHT: 61 IN | WEIGHT: 141 LBS

## 2021-08-02 DIAGNOSIS — F90.2 ATTENTION DEFICIT HYPERACTIVITY DISORDER (ADHD), COMBINED TYPE: Chronic | ICD-10-CM

## 2021-08-02 DIAGNOSIS — G47.09 OTHER INSOMNIA: Chronic | ICD-10-CM

## 2021-08-02 DIAGNOSIS — F41.1 GAD (GENERALIZED ANXIETY DISORDER): Chronic | ICD-10-CM

## 2021-08-02 DIAGNOSIS — F31.76 BIPOLAR I DISORDER, MOST RECENT EPISODE DEPRESSED, IN FULL REMISSION (HCC): Primary | Chronic | ICD-10-CM

## 2021-08-02 PROBLEM — R13.19 OTHER DYSPHAGIA: Status: ACTIVE | Noted: 2021-07-22

## 2021-08-02 PROCEDURE — 90833 PSYTX W PT W E/M 30 MIN: CPT | Performed by: PSYCHIATRY & NEUROLOGY

## 2021-08-02 PROCEDURE — 99214 OFFICE O/P EST MOD 30 MIN: CPT | Performed by: PSYCHIATRY & NEUROLOGY

## 2021-08-02 RX ORDER — LAMOTRIGINE 150 MG/1
150 TABLET ORAL DAILY
Qty: 90 TABLET | Refills: 1 | Status: SHIPPED | OUTPATIENT
Start: 2021-08-02 | End: 2022-01-24 | Stop reason: SDUPTHER

## 2021-08-02 RX ORDER — BUPROPION HYDROCHLORIDE 150 MG/1
150 TABLET ORAL DAILY
Qty: 90 TABLET | Refills: 1 | Status: SHIPPED | OUTPATIENT
Start: 2021-08-02 | End: 2022-01-24 | Stop reason: SDUPTHER

## 2021-08-02 RX ORDER — TRAZODONE HYDROCHLORIDE 50 MG/1
50 TABLET ORAL
Qty: 90 TABLET | Refills: 1 | Status: SHIPPED | OUTPATIENT
Start: 2021-08-02 | End: 2022-01-24 | Stop reason: SDUPTHER
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
## 2021-08-02 NOTE — BH TREATMENT PLAN
TREATMENT PLAN (Medication Management Only)        [facility]    Name/Date of Birth/MRN:  Ender Deras 77 y o  1955 MRN: [id number]  Date of Treatment Plan: August 2, 2021  Diagnosis/Diagnoses:   1  Bipolar I disorder, most recent episode depressed, in full remission ([facility] [address] )    2  RAMEZ (generalized anxiety disorder)    3  Attention deficit hyperactivity disorder (ADHD), combined type    4  Other insomnia      Strengths/Personal Resources for Self-Care: "I want to be healthy, I want to live a healthy life"  Area/Areas of need (in own words): "stress"  1  Long Term Goal:   maintain control of anxiety, maintain mood stability, help with ADHD symptoms  Target Date: 2 months - 10/2/2021  Person/Persons responsible for completion of goal: Sofia  2  Short Term Objective (s) - How will we reach this goal?:   A  Provider new recommended medication/dosage changes and/or continue medication(s): increase Lamictal, discontinue Ambien, start Trazodone, continue all other medications (Wellbutrin XL)  B   N/A   C   N/A  Target Date: 2 months - 10/2/2021  Person/Persons Responsible for Completion of Goal: Sofia   Progress Towards Goals: stable  Treatment Modality: medication management every 2 months  Review due 180 days from date of this plan: 6 months - 2/2/2022  Expected length of service: maintenance unless revised  My Physician/PA/NP and I have developed this plan together and I agree to work on the goals and objectives  I understand the treatment goals that were developed for my treatment    Electronic Signatures: on file (unless signed below)    Erich Stuart MD 08/02/21

## 2021-08-05 ENCOUNTER — TELEPHONE (OUTPATIENT)
Dept: PSYCHIATRY | Facility: CLINIC | Age: 66
End: 2021-08-05

## 2021-08-05 NOTE — TELEPHONE ENCOUNTER
Patient called stating that she went to her PCP and received an EKG and stress test and the office is going to be faxing over those results later today

## 2021-08-11 NOTE — TELEPHONE ENCOUNTER
Patient called again stating that she went through all those steps to be places back on a medication that was discussed between the both of you and she would like to know when she can go back on that medication

## 2021-08-16 NOTE — TELEPHONE ENCOUNTER
She had some changes on her most recent EKG - please let her know that she needs to ask her PCP for a letter indicating that she can start stimulant medication for ADHD from cardiac standpoint

## 2021-08-17 NOTE — TELEPHONE ENCOUNTER
I called her PCP myself and reiterated exactly what the provider said in her last message  The  stated that she would send a message to her PCP requesting that letter  I gave her the call back number and my name to let me know when this letter is ready

## 2021-08-31 ENCOUNTER — EVALUATION (OUTPATIENT)
Dept: PHYSICAL THERAPY | Facility: REHABILITATION | Age: 66
End: 2021-08-31
Payer: MEDICARE

## 2021-08-31 DIAGNOSIS — M54.2 NECK PAIN: Primary | ICD-10-CM

## 2021-08-31 PROCEDURE — 97162 PT EVAL MOD COMPLEX 30 MIN: CPT

## 2021-08-31 PROCEDURE — 97112 NEUROMUSCULAR REEDUCATION: CPT
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
## 2021-09-10 ENCOUNTER — OFFICE VISIT (OUTPATIENT)
Dept: PHYSICAL THERAPY | Facility: REHABILITATION | Age: 66
End: 2021-09-10
Payer: MEDICARE

## 2021-09-10 DIAGNOSIS — M54.2 NECK PAIN: Primary | ICD-10-CM

## 2021-09-10 PROCEDURE — 97110 THERAPEUTIC EXERCISES: CPT

## 2021-09-10 PROCEDURE — 97140 MANUAL THERAPY 1/> REGIONS: CPT

## 2021-09-10 NOTE — PROGRESS NOTES
Daily Note     Today's date: 9/10/2021  Patient name: Jayden López  : 1955  MRN: [id number]  Referring provider: Marleny Yang DO  Dx:   Encounter Diagnosis     ICD-10-CM    1  Neck pain  M54 2        Start Time: 1016  Stop Time: 1055  Total time in clinic (min): 39 minutes    Subjective: Reports she gets headaches after sitting and watching tv for an extended period of time  Also reports some discomfort waking up from sleep  Objective: See treatment diary below  Limited R rotation/SB  Hypomobility of R cervical spine  Hypomobile R first rib    Assessment: Tolerated treatment well  Tejas Delvalle displays a R shear and left rotation of her cervical spine  Displays extended cervical spine at rest  Educated to perform supine thoracic ext with LTR to improve thoracic mobility, lat flexibility, and anterior shoulder musculature  Also given chin tucks for HEP to improve DNF strength and postural stability  Following manual therapy, pt displayed improved R rotation and side bending with less discomfort  Patient exhibited good technique with therapeutic exercises and would benefit from continued PT to improve postural stability and decrease headache frequency  I supervised and participated in the treatment performed by MICHAEL Tapia  Plan: Continue per plan of care        Precautions:  osteopenia, HTN, hypothyroidism, DMII    Manuals 08/31 09/10           C1-2 MET lateral glide  QD 2x3 bouts            Scalene STM  8'           First rib mob  [facility] g3-4 3x30           Assessment  QD/AC           Neuro Re-Ed 08/31 09/10                                                                                         HEP/education 15'            Ther Ex  09/10           Chin tucks  2x10 HEP           Supine thoracic ext w/foam roll   5' w/LTR                                                                            HEP/education  15'           Ther Activity                                       Gait Training                                       Modalities

## 2021-09-13 ENCOUNTER — OFFICE VISIT (OUTPATIENT)
Dept: PHYSICAL THERAPY | Facility: REHABILITATION | Age: 66
End: 2021-09-13
Payer: MEDICARE

## 2021-09-13 DIAGNOSIS — M54.2 NECK PAIN: Primary | ICD-10-CM

## 2021-09-13 PROCEDURE — 97140 MANUAL THERAPY 1/> REGIONS: CPT

## 2021-09-13 PROCEDURE — 97110 THERAPEUTIC EXERCISES: CPT

## 2021-09-13 NOTE — PROGRESS NOTES
Daily Note     Today's date: 2021  Patient name: Akosua Dominique  : 1955  MRN: [id number]  Referring provider: Shari Rodrigez DO  Dx:   Encounter Diagnosis     ICD-10-CM    1  Neck pain  M54 2        Start Time: 1035  Stop Time: 1129  Total time in clinic (min): 54 minutes    Subjective: Reports she woke up at 4am this morning with a headache  States she usually sleeps on either side with a pillow  Objective: See treatment diary below    Assessment: Tolerated treatment well  Range of motion improvements noted since last visit however, still has decreased mobility of R cervical spine and tautness of R anterior/scalenes  Pt reported improvement in overall symptoms following treatment today  Educated to double up pillow when sleeping to encourage better neck positioning  Patient exhibited good technique with therapeutic exercises and would benefit from continued PT to improve postural stability and decrease headache frequency  I supervised and participated in the treatment performed by Kimberlee Menon, MICHAEL  Plan: Continue per plan of care        Precautions:  osteopenia, HTN, hypothyroidism, DMII    Manuals 08/31 09/10 09/13          C1-2 MET lateral glide  QD 2x3 bouts  QD 2x3 bouts  1x3 AC          Scalene STM  8' 15'          First rib mob  [facility] g3-4 3x30           Cervical downglides   R side g3-4 AC           Assessment  QD/AC QD/AC 15'          Neuro Re-Ed 08/31 09/10 09/13                                                                                        HEP/education 15'            Ther Ex  09/10 09/13          Chin tucks  2x10 HEP 20x10"           Supine thoracic ext w/foam roll   5' w/LTR           No moneys   2x10x3" OTB                                                              HEP/education  15' 10'          Ther Activity                                       Gait Training                                       Modalities

## 2021-09-17 ENCOUNTER — OFFICE VISIT (OUTPATIENT)
Dept: PHYSICAL THERAPY | Facility: REHABILITATION | Age: 66
End: 2021-09-17
Payer: MEDICARE

## 2021-09-17 DIAGNOSIS — M54.2 NECK PAIN: Primary | ICD-10-CM

## 2021-09-17 PROCEDURE — 97110 THERAPEUTIC EXERCISES: CPT

## 2021-09-17 PROCEDURE — 97140 MANUAL THERAPY 1/> REGIONS: CPT

## 2021-09-17 PROCEDURE — 97112 NEUROMUSCULAR REEDUCATION: CPT

## 2021-09-17 NOTE — PROGRESS NOTES
Daily Note     Today's date: 2021  Patient name: Shanna Fabian  : 1955  MRN: [id number]  Referring provider: Demetris Larson DO  Dx:   Encounter Diagnosis     ICD-10-CM    1  Neck pain  M54 2        Start Time: 915  Stop Time:   Total time in clinic (min): 55 minutes    Subjective: Reports she is feeling better  Headaches are decreasing and do not last as long  States she still feels them most when sitting watching TV  Objective: See treatment diary below    Assessment: Tolerated treatment well  No discomfort reported during exercise protocol  Educated to put pillow across low back while sitting to improve posture when watching tv for long periods of time  Patient exhibited good technique with therapeutic exercises and would benefit from continued PT to improve postural stability and decrease headache frequency  I supervised and participated in the treatment performed by MICHAEL Ahn  Plan: Continue per plan of care        Precautions:  osteopenia, HTN, hypothyroidism, DMII    Manuals 08/31 09/10 09/13 09/17         C1-2 MET lateral glide  QD 2x3 bouts  QD 2x3 bouts  1x3 AC QD 2x3 bouts R side         Scalene STM  8' 15' 15' AC         First rib mob  AC g3-4 3x30  QD 3 bouts          Cervical downglides   R side g3-4 AC           Assessment  QD/AC QD/AC 15' QD         Neuro Re-Ed 08/31 09/10 09/13 09/17                                                                                       HEP/education 15'   15' pillows and positioning, posture importance         Ther Ex  09/10 09/13 09/17         Chin tucks  2x10 HEP 20x10"  20x10"         Supine thoracic ext w/foam roll   5' w/LTR  5' w/LTR         No moneys   2x10x3" OTB 2x10x3" OTB                                                             HEP/education  15' 10'          Ther Activity                                       Gait Training                                       Modalities

## 2021-09-20 ENCOUNTER — OFFICE VISIT (OUTPATIENT)
Dept: PHYSICAL THERAPY | Facility: REHABILITATION | Age: 66
End: 2021-09-20
Payer: MEDICARE

## 2021-09-20 DIAGNOSIS — M54.2 NECK PAIN: Primary | ICD-10-CM

## 2021-09-20 PROCEDURE — 97140 MANUAL THERAPY 1/> REGIONS: CPT

## 2021-09-20 PROCEDURE — 97110 THERAPEUTIC EXERCISES: CPT

## 2021-09-20 NOTE — PROGRESS NOTES
Daily Note     Today's date: 2021  Patient name: Yasmine Smith  : 1955  MRN: [id number]  Referring provider: Anderson Rivera DO  Dx:   Encounter Diagnosis     ICD-10-CM    1  Neck pain  M54 2        Start Time: 945  Stop Time:   Total time in clinic (min): 40 minutes    Subjective: Reports she is feeling better  Headaches are not as bad and she has more neck motion  Objective: See treatment diary below    Assessment: Tolerated treatment well  Headaches reproduced with STM to R SCM  Improved motion and decreased discomfort following manual therapy  Educated to perform HEP 2x/day and was educated on exercise sets/reps  Patient exhibited good technique with therapeutic exercises and would benefit from continued PT to improve postural stability and decrease headache frequency  Plan: Continue per plan of care        Precautions:  osteopenia, HTN, hypothyroidism, DMII    Manuals 08/31 09/10 09/13 09/17 09/20        C1-2 MET lateral glide  QD 2x3 bouts  QD 2x3 bouts  1x3 AC QD 2x3 bouts R side QD 1x3 C1 R side    Rot R 3x3 bouts QD        Scalene STM  8' 15' 15' AC 15' QD and SCM        First rib mob  AC g3-4 3x30  QD 3 bouts  QD 3 bouts g4        Cervical downglides   R side g3-4 AC           Assessment  QD/AC QD/AC 15' QD QD        Neuro Re-Ed 08/31 09/10 09/13 09/17 09/20                                                                                      HEP/education 15'   15' pillows and positioning, posture importance         Ther Ex  09/10 09/13 09/17 09/20        Chin tucks  2x10 HEP 20x10"  20x10" 3x10x5"        Supine thoracic ext w/foam roll   5' w/LTR  5' w/LTR 8' LTR        No moneys   2x10x3" OTB 2x10x3" OTB 10x10" OTB                                                            HEP/education  15' 10'  5'        Ther Activity                                       Gait Training                                       Modalities

## 2021-09-24 ENCOUNTER — OFFICE VISIT (OUTPATIENT)
Dept: PHYSICAL THERAPY | Facility: REHABILITATION | Age: 66
End: 2021-09-24
Payer: MEDICARE

## 2021-09-24 DIAGNOSIS — M54.2 NECK PAIN: Primary | ICD-10-CM

## 2021-09-24 PROCEDURE — 97112 NEUROMUSCULAR REEDUCATION: CPT

## 2021-09-24 PROCEDURE — 97140 MANUAL THERAPY 1/> REGIONS: CPT

## 2021-09-24 NOTE — PROGRESS NOTES
Daily Note     Today's date: 2021  Patient name: Adriana Briones  : 1955  MRN: [id number]  Referring provider: Dhruv Bentley DO  Dx:   Encounter Diagnosis     ICD-10-CM    1  Neck pain  M54 2        Start Time: 1110  Stop Time: 1150  Total time in clinic (min): 40 minutes    Subjective: Reports she is not having many headaches any more  States she is feeling good and has been keeping up with her HEP  Objective: See treatment diary below    Assessment: Tolerated treatment well  Displayed decreased discomfort, feeling of looseness, and improved range of motion following manual therapy and STM  Discussed HEP with pt as she is going away for a couple weeks on vacation  Patient exhibited good technique with therapeutic exercises and would benefit from continued PT to improve postural stability and decrease headache frequency  Plan: Continue per plan of care        Precautions:  osteopenia, HTN, hypothyroidism, DMII    Manuals 08/31 09/10 09/13 09/17 09/20 09/24       C1-2 MET lateral glide  QD 2x3 bouts  QD 2x3 bouts  1x3 AC QD 2x3 bouts R side QD 1x3 C1 R side    Rot R 3x3 bouts QD QD 1x3 C1 R side    AC C3-4 MET R side        Scalene STM  8' 15' 15' AC 15' QD and SCM 10'       First rib mob  AC g3-4 3x30  QD 3 bouts  QD 3 bouts g4 QD 3 bouts g4       Cervical downglides   R side g3-4 AC           Assessment  QD/AC QD/AC 15' QD QD QD       Neuro Re-Ed 08/31 09/10 09/13 09/17 09/20 09/24                                                                                     HEP/education 15'   15' pillows and positioning, posture importance  15' HEP discussion, frequency, sets/rreps, technique       Ther Ex  09/10 09/13 09/17 09/20 09/24       Chin tucks  2x10 HEP 20x10"  20x10" 3x10x5"        Supine thoracic ext w/foam roll   5' w/LTR  5' w/LTR 8' LTR        No moneys   2x10x3" OTB 2x10x3" OTB 10x10" OTB                                                            HEP/education  15' 10'  5'        Ther Activity                                       Gait Training                                       Modalities

## 2021-09-27 ENCOUNTER — APPOINTMENT (OUTPATIENT)
Dept: PHYSICAL THERAPY | Facility: REHABILITATION | Age: 66
End: 2021-09-27
Payer: MEDICARE

## 2021-10-11 ENCOUNTER — OFFICE VISIT (OUTPATIENT)
Dept: PHYSICAL THERAPY | Facility: REHABILITATION | Age: 66
End: 2021-10-11
Payer: MEDICARE

## 2021-10-11 DIAGNOSIS — M54.2 NECK PAIN: Primary | ICD-10-CM

## 2021-10-11 PROCEDURE — 97140 MANUAL THERAPY 1/> REGIONS: CPT

## 2021-10-11 PROCEDURE — 97110 THERAPEUTIC EXERCISES: CPT

## 2021-10-13 ENCOUNTER — OFFICE VISIT (OUTPATIENT)
Dept: PSYCHIATRY | Facility: CLINIC | Age: 66
End: 2021-10-13
Payer: MEDICARE

## 2021-10-13 VITALS
DIASTOLIC BLOOD PRESSURE: 70 MMHG | WEIGHT: 136 LBS | SYSTOLIC BLOOD PRESSURE: 114 MMHG | BODY MASS INDEX: 25.03 KG/M2 | HEIGHT: 62 IN | HEART RATE: 85 BPM

## 2021-10-13 DIAGNOSIS — F41.1 GAD (GENERALIZED ANXIETY DISORDER): Chronic | ICD-10-CM

## 2021-10-13 DIAGNOSIS — F31.76 BIPOLAR I DISORDER, MOST RECENT EPISODE DEPRESSED, IN FULL REMISSION (HCC): Primary | Chronic | ICD-10-CM

## 2021-10-13 DIAGNOSIS — F90.2 ATTENTION DEFICIT HYPERACTIVITY DISORDER (ADHD), COMBINED TYPE: Chronic | ICD-10-CM

## 2021-10-13 DIAGNOSIS — G47.09 OTHER INSOMNIA: Chronic | ICD-10-CM

## 2021-10-13 DIAGNOSIS — Z79.899 LONG-TERM USE OF HIGH-RISK MEDICATION: Chronic | ICD-10-CM

## 2021-10-13 PROBLEM — E11.65 UNCONTROLLED TYPE 2 DIABETES MELLITUS WITH HYPERGLYCEMIA (HCC): Status: ACTIVE | Noted: 2019-08-05

## 2021-10-13 PROBLEM — R25.1 TREMOR: Status: ACTIVE | Noted: 2021-09-17

## 2021-10-13 PROCEDURE — 90833 PSYTX W PT W E/M 30 MIN: CPT | Performed by: PSYCHIATRY & NEUROLOGY

## 2021-10-13 PROCEDURE — 99214 OFFICE O/P EST MOD 30 MIN: CPT | Performed by: PSYCHIATRY & NEUROLOGY

## 2021-10-13 RX ORDER — METHYLPHENIDATE HYDROCHLORIDE 5 MG/1
5 TABLET ORAL 2 TIMES DAILY
Qty: 60 TABLET | Refills: 0 | Status: SHIPPED | OUTPATIENT
Start: 2021-10-13 | End: 2021-11-29 | Stop reason: SDUPTHER

## 2021-10-15 ENCOUNTER — OFFICE VISIT (OUTPATIENT)
Dept: PHYSICAL THERAPY | Facility: REHABILITATION | Age: 66
End: 2021-10-15
Payer: MEDICARE

## 2021-10-15 ENCOUNTER — APPOINTMENT (OUTPATIENT)
Dept: LAB | Facility: HOSPITAL | Age: 66
End: 2021-10-15
Attending: PSYCHIATRY & NEUROLOGY
Payer: MEDICARE

## 2021-10-15 DIAGNOSIS — M54.2 NECK PAIN: Primary | ICD-10-CM

## 2021-10-15 LAB
AMPHETAMINES SERPL QL SCN: NEGATIVE
BARBITURATES UR QL: NEGATIVE
BENZODIAZ UR QL: NEGATIVE
COCAINE UR QL: NEGATIVE
METHADONE UR QL: NEGATIVE
OPIATES UR QL SCN: NEGATIVE
OXYCODONE+OXYMORPHONE UR QL SCN: NEGATIVE
PCP UR QL: NEGATIVE
THC UR QL: NEGATIVE

## 2021-10-15 PROCEDURE — 97140 MANUAL THERAPY 1/> REGIONS: CPT

## 2021-10-15 PROCEDURE — 80307 DRUG TEST PRSMV CHEM ANLYZR: CPT | Performed by: PSYCHIATRY & NEUROLOGY

## 2021-10-15 PROCEDURE — 97110 THERAPEUTIC EXERCISES: CPT

## 2021-10-18 ENCOUNTER — APPOINTMENT (OUTPATIENT)
Dept: PHYSICAL THERAPY | Facility: REHABILITATION | Age: 66
End: 2021-10-18
Payer: MEDICARE

## 2021-10-21 ENCOUNTER — OFFICE VISIT (OUTPATIENT)
Dept: PHYSICAL THERAPY | Facility: REHABILITATION | Age: 66
End: 2021-10-21
Payer: MEDICARE

## 2021-10-21 DIAGNOSIS — M54.2 NECK PAIN: Primary | ICD-10-CM

## 2021-10-21 PROCEDURE — 97140 MANUAL THERAPY 1/> REGIONS: CPT

## 2021-10-21 PROCEDURE — 97110 THERAPEUTIC EXERCISES: CPT

## 2021-10-21 PROCEDURE — 97140 MANUAL THERAPY 1/> REGIONS: CPT | Performed by: PHYSICAL THERAPIST

## 2021-10-22 ENCOUNTER — APPOINTMENT (OUTPATIENT)
Dept: PHYSICAL THERAPY | Facility: REHABILITATION | Age: 66
End: 2021-10-22
Payer: MEDICARE

## 2021-10-25 ENCOUNTER — OFFICE VISIT (OUTPATIENT)
Dept: PHYSICAL THERAPY | Facility: REHABILITATION | Age: 66
End: 2021-10-25
Payer: MEDICARE

## 2021-10-25 DIAGNOSIS — M54.2 NECK PAIN: Primary | ICD-10-CM

## 2021-10-25 PROCEDURE — 97110 THERAPEUTIC EXERCISES: CPT

## 2021-10-25 PROCEDURE — 97140 MANUAL THERAPY 1/> REGIONS: CPT

## 2021-10-29 ENCOUNTER — OFFICE VISIT (OUTPATIENT)
Dept: PHYSICAL THERAPY | Facility: REHABILITATION | Age: 66
End: 2021-10-29
Payer: MEDICARE

## 2021-10-29 DIAGNOSIS — M54.2 NECK PAIN: Primary | ICD-10-CM

## 2021-10-29 PROCEDURE — 97140 MANUAL THERAPY 1/> REGIONS: CPT

## 2021-10-29 PROCEDURE — 97110 THERAPEUTIC EXERCISES: CPT

## 2021-11-01 ENCOUNTER — OFFICE VISIT (OUTPATIENT)
Dept: PHYSICAL THERAPY | Facility: REHABILITATION | Age: 66
End: 2021-11-01
Payer: MEDICARE

## 2021-11-01 DIAGNOSIS — M54.2 NECK PAIN: Primary | ICD-10-CM

## 2021-11-01 PROCEDURE — 97140 MANUAL THERAPY 1/> REGIONS: CPT

## 2021-11-01 PROCEDURE — 97110 THERAPEUTIC EXERCISES: CPT

## 2021-11-05 ENCOUNTER — OFFICE VISIT (OUTPATIENT)
Dept: PHYSICAL THERAPY | Facility: REHABILITATION | Age: 66
End: 2021-11-05
Payer: MEDICARE

## 2021-11-05 DIAGNOSIS — M54.2 NECK PAIN: Primary | ICD-10-CM

## 2021-11-05 PROCEDURE — 97110 THERAPEUTIC EXERCISES: CPT

## 2021-11-05 PROCEDURE — 97140 MANUAL THERAPY 1/> REGIONS: CPT

## 2021-11-08 ENCOUNTER — OFFICE VISIT (OUTPATIENT)
Dept: PHYSICAL THERAPY | Facility: REHABILITATION | Age: 66
End: 2021-11-08
Payer: MEDICARE

## 2021-11-08 DIAGNOSIS — M54.2 NECK PAIN: Primary | ICD-10-CM

## 2021-11-08 PROCEDURE — 97110 THERAPEUTIC EXERCISES: CPT

## 2021-11-08 PROCEDURE — 97140 MANUAL THERAPY 1/> REGIONS: CPT

## 2021-11-12 ENCOUNTER — OFFICE VISIT (OUTPATIENT)
Dept: PHYSICAL THERAPY | Facility: REHABILITATION | Age: 66
End: 2021-11-12
Payer: MEDICARE

## 2021-11-12 DIAGNOSIS — M54.2 NECK PAIN: Primary | ICD-10-CM

## 2021-11-12 PROCEDURE — 97140 MANUAL THERAPY 1/> REGIONS: CPT

## 2021-11-12 PROCEDURE — 97112 NEUROMUSCULAR REEDUCATION: CPT

## 2021-11-15 ENCOUNTER — APPOINTMENT (OUTPATIENT)
Dept: PHYSICAL THERAPY | Facility: REHABILITATION | Age: 66
End: 2021-11-15
Payer: MEDICARE

## 2021-11-19 ENCOUNTER — APPOINTMENT (OUTPATIENT)
Dept: PHYSICAL THERAPY | Facility: REHABILITATION | Age: 66
End: 2021-11-19
Payer: MEDICARE

## 2021-11-22 ENCOUNTER — OFFICE VISIT (OUTPATIENT)
Dept: PHYSICAL THERAPY | Facility: REHABILITATION | Age: 66
End: 2021-11-22
Payer: MEDICARE

## 2021-11-22 DIAGNOSIS — M54.2 NECK PAIN: Primary | ICD-10-CM

## 2021-11-22 PROCEDURE — 97140 MANUAL THERAPY 1/> REGIONS: CPT

## 2021-11-22 PROCEDURE — 97110 THERAPEUTIC EXERCISES: CPT

## 2021-11-26 ENCOUNTER — OFFICE VISIT (OUTPATIENT)
Dept: PHYSICAL THERAPY | Facility: REHABILITATION | Age: 66
End: 2021-11-26
Payer: MEDICARE

## 2021-11-26 DIAGNOSIS — M54.2 NECK PAIN: Primary | ICD-10-CM

## 2021-11-26 PROCEDURE — 97112 NEUROMUSCULAR REEDUCATION: CPT

## 2021-11-26 PROCEDURE — 97140 MANUAL THERAPY 1/> REGIONS: CPT

## 2021-11-29 ENCOUNTER — OFFICE VISIT (OUTPATIENT)
Dept: PSYCHIATRY | Facility: CLINIC | Age: 66
End: 2021-11-29
Payer: MEDICARE

## 2021-11-29 ENCOUNTER — OFFICE VISIT (OUTPATIENT)
Dept: PHYSICAL THERAPY | Facility: REHABILITATION | Age: 66
End: 2021-11-29
Payer: MEDICARE

## 2021-11-29 VITALS
WEIGHT: 135 LBS | HEIGHT: 62 IN | BODY MASS INDEX: 24.84 KG/M2 | DIASTOLIC BLOOD PRESSURE: 72 MMHG | HEART RATE: 99 BPM | SYSTOLIC BLOOD PRESSURE: 129 MMHG

## 2021-11-29 DIAGNOSIS — F31.76 BIPOLAR I DISORDER, MOST RECENT EPISODE DEPRESSED, IN FULL REMISSION (HCC): Primary | Chronic | ICD-10-CM

## 2021-11-29 DIAGNOSIS — F90.2 ATTENTION DEFICIT HYPERACTIVITY DISORDER (ADHD), COMBINED TYPE: Chronic | ICD-10-CM

## 2021-11-29 DIAGNOSIS — G47.09 OTHER INSOMNIA: Chronic | ICD-10-CM

## 2021-11-29 DIAGNOSIS — Z79.899 LONG-TERM USE OF HIGH-RISK MEDICATION: Chronic | ICD-10-CM

## 2021-11-29 DIAGNOSIS — M54.2 NECK PAIN: Primary | ICD-10-CM

## 2021-11-29 DIAGNOSIS — F41.1 GAD (GENERALIZED ANXIETY DISORDER): Chronic | ICD-10-CM

## 2021-11-29 PROCEDURE — 97110 THERAPEUTIC EXERCISES: CPT

## 2021-11-29 PROCEDURE — 97140 MANUAL THERAPY 1/> REGIONS: CPT

## 2021-11-29 PROCEDURE — 99214 OFFICE O/P EST MOD 30 MIN: CPT | Performed by: PSYCHIATRY & NEUROLOGY

## 2021-11-29 PROCEDURE — 90833 PSYTX W PT W E/M 30 MIN: CPT | Performed by: PSYCHIATRY & NEUROLOGY

## 2021-11-29 RX ORDER — METHYLPHENIDATE HYDROCHLORIDE 5 MG/1
5 TABLET ORAL 2 TIMES DAILY
Qty: 60 TABLET | Refills: 0 | Status: SHIPPED | OUTPATIENT
Start: 2021-12-29 | End: 2022-01-24 | Stop reason: SDUPTHER

## 2021-11-29 RX ORDER — METHYLPHENIDATE HYDROCHLORIDE 5 MG/1
5 TABLET ORAL
Qty: 60 TABLET | Refills: 0 | Status: SHIPPED | OUTPATIENT
Start: 2021-11-29 | End: 2022-01-24 | Stop reason: SDUPTHER

## 2021-12-03 ENCOUNTER — OFFICE VISIT (OUTPATIENT)
Dept: PHYSICAL THERAPY | Facility: REHABILITATION | Age: 66
End: 2021-12-03
Payer: MEDICARE

## 2021-12-03 DIAGNOSIS — M54.2 NECK PAIN: Primary | ICD-10-CM

## 2021-12-03 PROCEDURE — 97140 MANUAL THERAPY 1/> REGIONS: CPT

## 2021-12-03 PROCEDURE — 97110 THERAPEUTIC EXERCISES: CPT

## 2021-12-07 ENCOUNTER — OFFICE VISIT (OUTPATIENT)
Dept: PHYSICAL THERAPY | Facility: REHABILITATION | Age: 66
End: 2021-12-07
Payer: MEDICARE

## 2021-12-07 DIAGNOSIS — M54.2 NECK PAIN: Primary | ICD-10-CM

## 2021-12-07 PROCEDURE — 97140 MANUAL THERAPY 1/> REGIONS: CPT

## 2021-12-07 PROCEDURE — 97112 NEUROMUSCULAR REEDUCATION: CPT

## 2021-12-14 ENCOUNTER — OFFICE VISIT (OUTPATIENT)
Dept: PHYSICAL THERAPY | Facility: REHABILITATION | Age: 66
End: 2021-12-14
Payer: MEDICARE

## 2021-12-14 DIAGNOSIS — M54.2 NECK PAIN: Primary | ICD-10-CM

## 2021-12-14 PROCEDURE — 97140 MANUAL THERAPY 1/> REGIONS: CPT

## 2021-12-14 PROCEDURE — 97110 THERAPEUTIC EXERCISES: CPT

## 2021-12-21 ENCOUNTER — OFFICE VISIT (OUTPATIENT)
Dept: PHYSICAL THERAPY | Facility: REHABILITATION | Age: 66
End: 2021-12-21
Payer: MEDICARE

## 2021-12-21 DIAGNOSIS — M54.2 NECK PAIN: Primary | ICD-10-CM

## 2021-12-21 PROCEDURE — 97110 THERAPEUTIC EXERCISES: CPT

## 2021-12-21 PROCEDURE — 97140 MANUAL THERAPY 1/> REGIONS: CPT

## 2021-12-28 ENCOUNTER — OFFICE VISIT (OUTPATIENT)
Dept: PHYSICAL THERAPY | Facility: REHABILITATION | Age: 66
End: 2021-12-28
Payer: MEDICARE

## 2021-12-28 DIAGNOSIS — M54.2 NECK PAIN: Primary | ICD-10-CM

## 2021-12-28 PROCEDURE — 97110 THERAPEUTIC EXERCISES: CPT

## 2021-12-28 PROCEDURE — 97140 MANUAL THERAPY 1/> REGIONS: CPT
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
## 2022-01-24 ENCOUNTER — OFFICE VISIT (OUTPATIENT)
Dept: PSYCHIATRY | Facility: CLINIC | Age: 67
End: 2022-01-24
Payer: MEDICARE

## 2022-01-24 VITALS
BODY MASS INDEX: 24.84 KG/M2 | HEIGHT: 62 IN | WEIGHT: 135 LBS | SYSTOLIC BLOOD PRESSURE: 116 MMHG | HEART RATE: 83 BPM | DIASTOLIC BLOOD PRESSURE: 60 MMHG

## 2022-01-24 DIAGNOSIS — G47.09 OTHER INSOMNIA: Chronic | ICD-10-CM

## 2022-01-24 DIAGNOSIS — F31.76 BIPOLAR I DISORDER, MOST RECENT EPISODE DEPRESSED, IN FULL REMISSION (HCC): Primary | Chronic | ICD-10-CM

## 2022-01-24 DIAGNOSIS — Z79.899 LONG-TERM USE OF HIGH-RISK MEDICATION: Chronic | ICD-10-CM

## 2022-01-24 DIAGNOSIS — F41.1 GAD (GENERALIZED ANXIETY DISORDER): Chronic | ICD-10-CM

## 2022-01-24 DIAGNOSIS — F90.2 ATTENTION DEFICIT HYPERACTIVITY DISORDER (ADHD), COMBINED TYPE: Chronic | ICD-10-CM

## 2022-01-24 PROCEDURE — 99214 OFFICE O/P EST MOD 30 MIN: CPT | Performed by: PSYCHIATRY & NEUROLOGY

## 2022-01-24 PROCEDURE — 90833 PSYTX W PT W E/M 30 MIN: CPT | Performed by: PSYCHIATRY & NEUROLOGY

## 2022-01-24 RX ORDER — METHYLPHENIDATE HYDROCHLORIDE 10 MG/1
10 TABLET ORAL 2 TIMES DAILY
Qty: 60 TABLET | Refills: 0 | Status: SHIPPED | OUTPATIENT
Start: 2022-01-26 | End: 2022-04-06 | Stop reason: SDUPTHER

## 2022-01-24 RX ORDER — BUPROPION HYDROCHLORIDE 150 MG/1
150 TABLET ORAL DAILY
Qty: 90 TABLET | Refills: 2 | Status: SHIPPED | OUTPATIENT
Start: 2022-01-24 | End: 2022-07-06 | Stop reason: SDUPTHER

## 2022-01-24 RX ORDER — BLOOD SUGAR DIAGNOSTIC
STRIP MISCELLANEOUS 4 TIMES DAILY
COMMUNITY
Start: 2021-12-21

## 2022-01-24 RX ORDER — TRAZODONE HYDROCHLORIDE 50 MG/1
50 TABLET ORAL
Qty: 90 TABLET | Refills: 2 | Status: SHIPPED | OUTPATIENT
Start: 2022-01-24 | End: 2022-07-06 | Stop reason: SDUPTHER

## 2022-01-24 RX ORDER — METHYLPHENIDATE HYDROCHLORIDE 10 MG/1
10 TABLET ORAL 2 TIMES DAILY
Qty: 60 TABLET | Refills: 0 | Status: SHIPPED | OUTPATIENT
Start: 2022-03-27 | End: 2022-04-06 | Stop reason: SDUPTHER

## 2022-01-24 RX ORDER — METHYLPHENIDATE HYDROCHLORIDE 10 MG/1
10 TABLET ORAL 2 TIMES DAILY
Qty: 60 TABLET | Refills: 0 | Status: SHIPPED | OUTPATIENT
Start: 2022-02-25 | End: 2022-04-06 | Stop reason: SDUPTHER

## 2022-01-24 RX ORDER — LANCETS
EACH MISCELLANEOUS 4 TIMES DAILY
COMMUNITY
Start: 2021-12-18

## 2022-01-24 RX ORDER — LAMOTRIGINE 150 MG/1
150 TABLET ORAL DAILY
Qty: 90 TABLET | Refills: 2 | Status: SHIPPED | OUTPATIENT
Start: 2022-01-24 | End: 2022-07-06 | Stop reason: SDUPTHER

## 2022-04-05 ENCOUNTER — ANNUAL EXAM (OUTPATIENT)
Dept: OBGYN CLINIC | Facility: CLINIC | Age: 67
End: 2022-04-05
Payer: MEDICARE

## 2022-04-05 VITALS
DIASTOLIC BLOOD PRESSURE: 70 MMHG | BODY MASS INDEX: 24.29 KG/M2 | SYSTOLIC BLOOD PRESSURE: 114 MMHG | WEIGHT: 132 LBS | HEIGHT: 62 IN

## 2022-04-05 DIAGNOSIS — M85.80 OSTEOPENIA, UNSPECIFIED LOCATION: ICD-10-CM

## 2022-04-05 DIAGNOSIS — T85.43XA RUPTURE OF IMPLANT OF RIGHT BREAST, INITIAL ENCOUNTER: ICD-10-CM

## 2022-04-05 DIAGNOSIS — Z12.31 ENCOUNTER FOR SCREENING MAMMOGRAM FOR BREAST CANCER: ICD-10-CM

## 2022-04-05 DIAGNOSIS — M81.0 AGE-RELATED OSTEOPOROSIS WITHOUT CURRENT PATHOLOGICAL FRACTURE: ICD-10-CM

## 2022-04-05 DIAGNOSIS — N95.2 VAGINAL ATROPHY: ICD-10-CM

## 2022-04-05 DIAGNOSIS — Z01.419 ENCOUNTER FOR ANNUAL ROUTINE GYNECOLOGICAL EXAMINATION: Primary | ICD-10-CM

## 2022-04-05 PROCEDURE — G0101 CA SCREEN;PELVIC/BREAST EXAM: HCPCS | Performed by: OBSTETRICS & GYNECOLOGY

## 2022-04-05 RX ORDER — ESTRADIOL 10 UG/1
1 INSERT VAGINAL 2 TIMES WEEKLY
Qty: 24 TABLET | Refills: 3 | Status: SHIPPED | OUTPATIENT
Start: 2022-04-07

## 2022-04-05 RX ORDER — ATORVASTATIN CALCIUM 20 MG/1
TABLET, FILM COATED ORAL
COMMUNITY
Start: 2022-01-26

## 2022-04-05 NOTE — PROGRESS NOTES
Assessment/Plan:  Pap smear deferred due to low risk status  Encouraged self-breast examination as well as calcium supplementation  Continue annual mammogram   Recommend DEXA scan follow-up osteopenia  Discussed ruptured right implant  Discussed further evaluation with plastic surgeon specialty  All questions answered  She will continue to follow-up with her primary care physician as scheduled  She will return to office in 1 year or p r n  No problem-specific Assessment & Plan notes found for this encounter  Diagnoses and all orders for this visit:    Encounter for annual routine gynecological examination    Encounter for screening mammogram for breast cancer  -     Mammo screening bilateral w 3d & cad; Future    Vaginal atrophy  -     estradiol (Vagifem) 10 MCG TABS vaginal tablet; Insert 1 tablet (10 mcg total) into the vagina 2 (two) times a week    Osteopenia, unspecified location  -     DXA bone density spine hip and pelvis; Future    Age-related osteoporosis without current pathological fracture   -     DXA bone density spine hip and pelvis; Future    Rupture of implant of right breast, initial encounter    Other orders  -     atorvastatin (LIPITOR) 20 mg tablet          Subjective:      Patient ID: Fer Lloyd is a 79 y o  female  HPI   This is a very pleasant 70-year-old female  ( x3, age 37, 39, 40) presents as a new patient for her annual gyn exam, last gyn exam approximately 3 years ago  She went through menopause at age 48  She has never been on hormone replacement therapy  She denies any vaginal bleeding or spotting  No changes in bowel or bladder function  Her gyn history significant for vaginal hysterectomy at age 50  This was done for prolapse issues  Patient has been in a monogamous relationship for over 25 years    Pap smears have always been normal     Colon 10/2020    Rt ruptured implant    The following portions of the patient's history were reviewed and updated as appropriate: allergies, current medications, past family history, past medical history, past social history, past surgical history and problem list     Review of Systems   Constitutional: Negative for fatigue, fever and unexpected weight change  Respiratory: Negative for cough, chest tightness, shortness of breath and wheezing  Cardiovascular: Negative  Negative for chest pain and palpitations  Gastrointestinal: Negative  Negative for abdominal distention, abdominal pain, blood in stool, constipation, diarrhea, nausea and vomiting  Genitourinary: Negative  Negative for difficulty urinating, dyspareunia, dysuria, flank pain, frequency, genital sores, hematuria, pelvic pain, urgency, vaginal bleeding, vaginal discharge and vaginal pain  Skin: Negative for rash  Objective:      /70   Ht 5' 1 5" (1 562 m)   Wt 59 9 kg (132 lb)   BMI 24 54 kg/m²          Physical Exam  Constitutional:       Appearance: Normal appearance  She is well-developed  Cardiovascular:      Rate and Rhythm: Normal rate and regular rhythm  Pulmonary:      Effort: Pulmonary effort is normal       Breath sounds: Normal breath sounds  Chest:   Breasts:      Right: No inverted nipple, mass, nipple discharge, skin change or tenderness  Left: No inverted nipple, mass, nipple discharge, skin change or tenderness  Comments: Bilateral saline breast implants noted, right implant ruptured  Abdominal:      General: Bowel sounds are normal  There is no distension  Palpations: Abdomen is soft  Tenderness: There is no abdominal tenderness  There is no guarding or rebound  Genitourinary:     Labia:         Right: No rash, tenderness or lesion  Left: No rash, tenderness or lesion  Vagina: Normal  No signs of injury  No vaginal discharge or tenderness  Uterus: Absent  Adnexa:         Right: No mass, tenderness or fullness  Left: No mass, tenderness or fullness  Comments: External genitalia is within normal limits  The vagina is evident of estrogen deficiency  The cervix is surgically absent  The cuff is well-supported  Rectovaginal exam is confirmatory  Neurological:      Mental Status: She is alert and oriented to person, place, and time     Psychiatric:         Behavior: Behavior normal
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
## 2022-04-06 ENCOUNTER — OFFICE VISIT (OUTPATIENT)
Dept: PSYCHIATRY | Facility: CLINIC | Age: 67
End: 2022-04-06
Payer: MEDICARE

## 2022-04-06 VITALS
SYSTOLIC BLOOD PRESSURE: 116 MMHG | BODY MASS INDEX: 24.48 KG/M2 | HEIGHT: 62 IN | HEART RATE: 96 BPM | WEIGHT: 133 LBS | DIASTOLIC BLOOD PRESSURE: 75 MMHG

## 2022-04-06 DIAGNOSIS — F90.2 ATTENTION DEFICIT HYPERACTIVITY DISORDER (ADHD), COMBINED TYPE: Chronic | ICD-10-CM

## 2022-04-06 DIAGNOSIS — F31.76 BIPOLAR I DISORDER, MOST RECENT EPISODE DEPRESSED, IN FULL REMISSION (HCC): Primary | Chronic | ICD-10-CM

## 2022-04-06 DIAGNOSIS — G47.09 OTHER INSOMNIA: Chronic | ICD-10-CM

## 2022-04-06 DIAGNOSIS — F41.1 GAD (GENERALIZED ANXIETY DISORDER): Chronic | ICD-10-CM

## 2022-04-06 PROBLEM — Z79.899 LONG-TERM USE OF HIGH-RISK MEDICATION: Chronic | Status: RESOLVED | Noted: 2021-10-13 | Resolved: 2022-04-06

## 2022-04-06 PROBLEM — L65.9 HAIR LOSS: Status: ACTIVE | Noted: 2022-01-25

## 2022-04-06 PROBLEM — E10.9 TYPE I DIABETES MELLITUS, WELL CONTROLLED (HCC): Status: RESOLVED | Noted: 2021-04-26 | Resolved: 2022-04-06

## 2022-04-06 PROCEDURE — 99214 OFFICE O/P EST MOD 30 MIN: CPT | Performed by: PSYCHIATRY & NEUROLOGY

## 2022-04-06 PROCEDURE — 90833 PSYTX W PT W E/M 30 MIN: CPT | Performed by: PSYCHIATRY & NEUROLOGY

## 2022-04-06 RX ORDER — METHYLPHENIDATE HYDROCHLORIDE 10 MG/1
10 TABLET ORAL 2 TIMES DAILY
Qty: 60 TABLET | Refills: 0 | Status: SHIPPED | OUTPATIENT
Start: 2022-04-26 | End: 2022-07-06 | Stop reason: SDUPTHER

## 2022-04-06 RX ORDER — METHYLPHENIDATE HYDROCHLORIDE 10 MG/1
10 TABLET ORAL 2 TIMES DAILY
Qty: 60 TABLET | Refills: 0 | Status: SHIPPED | OUTPATIENT
Start: 2022-05-26 | End: 2022-07-06 | Stop reason: SDUPTHER

## 2022-04-06 RX ORDER — METHYLPHENIDATE HYDROCHLORIDE 10 MG/1
10 TABLET ORAL 2 TIMES DAILY
Qty: 60 TABLET | Refills: 0 | Status: SHIPPED | OUTPATIENT
Start: 2022-06-25 | End: 2022-07-06 | Stop reason: SDUPTHER

## 2022-04-06 NOTE — BH TREATMENT PLAN
TREATMENT PLAN (Medication Management Only)        [address]    Name/Date of Birth/MRN:  Charan Rodriguez 79 y o  1955 MRN: [id number]  Date of Treatment Plan: April 6, 2022  Diagnosis/Diagnoses:   1  Bipolar I disorder, most recent episode depressed, in full remission ([facility] [address] )    2  RAMEZ (generalized anxiety disorder)    3  Attention deficit hyperactivity disorder (ADHD), combined type    4  Other insomnia      Strengths/Personal Resources for Self-Care: "I open to getting health, I usually bring up any problems when I come to the appointment, I do take the medications"  Area/Areas of need (in own words): "consistency of exercise"  1  Long Term Goal:   maintain control of anxiety, maintain mood stability, maintain control of ADHD symptoms  Target Date: 3 months - 7/6/2022  Person/Persons responsible for completion of goal: Sofia  2  Short Term Objective (s) - How will we reach this goal?:   A  Provider new recommended medication/dosage changes and/or continue medication(s): continue current medications as prescribed (Wellbutrin XL, Trazodone, Lamictal and Ritalin)  B   N/A   C   N/A  Target Date: 3 months - 7/6/2022  Person/Persons Responsible for Completion of Goal: Sofia   Progress Towards Goals: stable  Treatment Modality: medication management every 3 months  Review due 180 days from date of this plan: 6 months - 10/6/2022  Expected length of service: maintenance unless revised  My Physician/PA/NP and I have developed this plan together and I agree to work on the goals and objectives  I understand the treatment goals that were developed for my treatment    Electronic Signatures: on file (unless signed below)    Mike Tony MD 04/06/22

## 2022-04-15 ENCOUNTER — HOSPITAL ENCOUNTER (OUTPATIENT)
Dept: RADIOLOGY | Age: 67
Discharge: HOME | End: 2022-04-15
Payer: MEDICARE

## 2022-04-15 VITALS — WEIGHT: 131.8 LBS | BODY MASS INDEX: 24.25 KG/M2 | HEIGHT: 62 IN

## 2022-04-15 DIAGNOSIS — Z12.31 ENCOUNTER FOR SCREENING MAMMOGRAM FOR MALIGNANT NEOPLASM OF BREAST: ICD-10-CM

## 2022-04-15 DIAGNOSIS — Z12.31 ENCOUNTER FOR SCREENING MAMMOGRAM FOR BREAST CANCER: ICD-10-CM

## 2022-04-15 PROCEDURE — 77063 BREAST TOMOSYNTHESIS BI: CPT

## 2022-04-15 PROCEDURE — 77067 SCR MAMMO BI INCL CAD: CPT

## 2022-04-20 ENCOUNTER — TELEPHONE (OUTPATIENT)
Dept: OBGYN CLINIC | Facility: CLINIC | Age: 67
End: 2022-04-20

## 2022-04-20 NOTE — TELEPHONE ENCOUNTER
Pt looking for screening mgram results (pt does not see in portal) - results from 4/15/2022 in pt's chart - (R) implant ruptured as prev stated on 2021 mgram results
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
## 2022-07-06 ENCOUNTER — OFFICE VISIT (OUTPATIENT)
Dept: PSYCHIATRY | Facility: CLINIC | Age: 67
End: 2022-07-06
Payer: MEDICARE

## 2022-07-06 VITALS
HEART RATE: 111 BPM | HEIGHT: 62 IN | SYSTOLIC BLOOD PRESSURE: 128 MMHG | DIASTOLIC BLOOD PRESSURE: 79 MMHG | BODY MASS INDEX: 24.48 KG/M2 | WEIGHT: 133 LBS

## 2022-07-06 DIAGNOSIS — G47.09 OTHER INSOMNIA: Chronic | ICD-10-CM

## 2022-07-06 DIAGNOSIS — F41.1 GAD (GENERALIZED ANXIETY DISORDER): Chronic | ICD-10-CM

## 2022-07-06 DIAGNOSIS — F90.2 ATTENTION DEFICIT HYPERACTIVITY DISORDER (ADHD), COMBINED TYPE: Chronic | ICD-10-CM

## 2022-07-06 DIAGNOSIS — F31.76 BIPOLAR I DISORDER, MOST RECENT EPISODE DEPRESSED, IN FULL REMISSION (HCC): Primary | Chronic | ICD-10-CM

## 2022-07-06 PROCEDURE — 99214 OFFICE O/P EST MOD 30 MIN: CPT | Performed by: PSYCHIATRY & NEUROLOGY

## 2022-07-06 PROCEDURE — 90833 PSYTX W PT W E/M 30 MIN: CPT | Performed by: PSYCHIATRY & NEUROLOGY

## 2022-07-06 RX ORDER — PEN NEEDLE, DIABETIC 32GX 5/32"
NEEDLE, DISPOSABLE MISCELLANEOUS
COMMUNITY
Start: 2022-06-01

## 2022-07-06 RX ORDER — UBIQUINOL 100 MG
CAPSULE ORAL
COMMUNITY
Start: 2022-06-01

## 2022-07-06 RX ORDER — TRAZODONE HYDROCHLORIDE 50 MG/1
50 TABLET ORAL
Qty: 90 TABLET | Refills: 2 | Status: SHIPPED | OUTPATIENT
Start: 2022-07-06 | End: 2023-04-02

## 2022-07-06 RX ORDER — METHYLPHENIDATE HYDROCHLORIDE 10 MG/1
10 TABLET ORAL 2 TIMES DAILY
Qty: 60 TABLET | Refills: 0 | Status: SHIPPED | OUTPATIENT
Start: 2022-09-23 | End: 2022-10-03 | Stop reason: SDUPTHER

## 2022-07-06 RX ORDER — LAMOTRIGINE 150 MG/1
150 TABLET ORAL DAILY
Qty: 90 TABLET | Refills: 2 | Status: SHIPPED | OUTPATIENT
Start: 2022-07-06 | End: 2023-04-02

## 2022-07-06 RX ORDER — BUPROPION HYDROCHLORIDE 150 MG/1
150 TABLET ORAL DAILY
Qty: 90 TABLET | Refills: 2 | Status: SHIPPED | OUTPATIENT
Start: 2022-07-06 | End: 2023-04-02

## 2022-07-06 RX ORDER — METHYLPHENIDATE HYDROCHLORIDE 10 MG/1
10 TABLET ORAL 2 TIMES DAILY
Qty: 60 TABLET | Refills: 0 | Status: SHIPPED | OUTPATIENT
Start: 2022-08-24 | End: 2022-10-03 | Stop reason: SDUPTHER

## 2022-07-06 RX ORDER — METHYLPHENIDATE HYDROCHLORIDE 10 MG/1
10 TABLET ORAL 2 TIMES DAILY
Qty: 60 TABLET | Refills: 0 | Status: SHIPPED | OUTPATIENT
Start: 2022-07-25 | End: 2022-10-03 | Stop reason: SDUPTHER
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
## 2022-10-03 ENCOUNTER — OFFICE VISIT (OUTPATIENT)
Dept: PSYCHIATRY | Facility: CLINIC | Age: 67
End: 2022-10-03
Payer: MEDICARE

## 2022-10-03 VITALS
WEIGHT: 139 LBS | HEIGHT: 62 IN | DIASTOLIC BLOOD PRESSURE: 82 MMHG | SYSTOLIC BLOOD PRESSURE: 146 MMHG | BODY MASS INDEX: 25.58 KG/M2 | HEART RATE: 92 BPM

## 2022-10-03 DIAGNOSIS — G47.09 OTHER INSOMNIA: Chronic | ICD-10-CM

## 2022-10-03 DIAGNOSIS — F41.1 GAD (GENERALIZED ANXIETY DISORDER): Chronic | ICD-10-CM

## 2022-10-03 DIAGNOSIS — F31.31 BIPOLAR I DISORDER, MOST RECENT EPISODE DEPRESSED, MILD (HCC): Primary | Chronic | ICD-10-CM

## 2022-10-03 DIAGNOSIS — F90.2 ATTENTION DEFICIT HYPERACTIVITY DISORDER (ADHD), COMBINED TYPE: Chronic | ICD-10-CM

## 2022-10-03 PROBLEM — R06.83 SNORING: Status: ACTIVE | Noted: 2022-08-30

## 2022-10-03 PROCEDURE — 90833 PSYTX W PT W E/M 30 MIN: CPT | Performed by: PSYCHIATRY & NEUROLOGY

## 2022-10-03 PROCEDURE — 99214 OFFICE O/P EST MOD 30 MIN: CPT | Performed by: PSYCHIATRY & NEUROLOGY

## 2022-10-03 RX ORDER — METHYLPHENIDATE HYDROCHLORIDE 10 MG/1
10 TABLET ORAL 2 TIMES DAILY
Qty: 60 TABLET | Refills: 0 | Status: SHIPPED | OUTPATIENT
Start: 2022-10-23 | End: 2022-11-22

## 2022-10-03 RX ORDER — METHYLPHENIDATE HYDROCHLORIDE 10 MG/1
10 TABLET ORAL 2 TIMES DAILY
Qty: 60 TABLET | Refills: 0 | Status: SHIPPED | OUTPATIENT
Start: 2022-11-22 | End: 2022-12-22

## 2022-10-03 RX ORDER — METHYLPHENIDATE HYDROCHLORIDE 10 MG/1
10 TABLET ORAL 2 TIMES DAILY
Qty: 60 TABLET | Refills: 0 | Status: SHIPPED | OUTPATIENT
Start: 2022-12-22 | End: 2023-01-21

## 2022-10-03 NOTE — BH TREATMENT PLAN
TREATMENT PLAN (Medication Management Only)        [facility]    Name/Date of Birth/MRN:  Mulugeta Jang 79 y o  1955 MRN: [id number]  Date of Treatment Plan: October 3, 2022  Diagnosis/Diagnoses:   1  Bipolar I disorder, most recent episode depressed, mild ([facility] [address] )    2  RAMEZ (generalized anxiety disorder)    3  Attention deficit hyperactivity disorder (ADHD), combined type    4  Other insomnia      Strengths/Personal Resources for Self-Care: "I am good at relaxing with music or time out, going for walks, I am good at reaching out when I need help"  Area/Areas of need (in own words): "maintaining, support if I need it"  1  Long Term Goal:   improve control of anxiety, maintain mood stability, control ADHD symptoms  Target Date: 3 months - 1/3/2023  Person/Persons responsible for completion of goal: Sofia  2  Short Term Objective (s) - How will we reach this goal?:   A  Provider new recommended medication/dosage changes and/or continue medication(s): continue current medications as prescribed (Wellbutrin XL, Trazodone, Lamictal and Ritalin)  B   N/A   C   N/A  Target Date: 3 months - 1/3/2023  Person/Persons Responsible for Completion of Goal: Sofia   Progress Towards Goals: progressing  Treatment Modality: medication management every 3 months  Review due 180 days from date of this plan: 6 months - 4/3/2023  Expected length of service: ongoing treatment unless revised  My Physician/PA/NP and I have developed this plan together and I agree to work on the goals and objectives  I understand the treatment goals that were developed for my treatment    Electronic Signatures: on file (unless signed below)    Hina Martinez MD 10/03/22

## 2022-11-18 ENCOUNTER — ANESTHESIA EVENT (OUTPATIENT)
Dept: PERIOP | Facility: HOSPITAL | Age: 67
End: 2022-11-18

## 2022-11-18 RX ORDER — OMEPRAZOLE 20 MG/1
20 CAPSULE, DELAYED RELEASE ORAL DAILY
COMMUNITY

## 2022-11-18 NOTE — PRE-PROCEDURE INSTRUCTIONS
Pre-Surgery Instructions:   Medication Instructions   • atorvastatin (LIPITOR) 20 mg tablet Take day of surgery  • buPROPion (WELLBUTRIN XL) 150 mg 24 hr tablet Take day of surgery  • estradiol (Vagifem) 10 MCG TABS vaginal tablet takes qtwice per week   • lamoTRIgine (LaMICtal) 150 MG tablet Take day of surgery  • Lantus SoloStar 100 units/mL injection pen Per MD Takes before lunch-Hold DOS   • levothyroxine 88 mcg tablet Take day of surgery  • metFORMIN (GLUCOPHAGE) 1000 MG tablet Hold day of surgery  • methylphenidate (Ritalin) 10 mg tablet Hold day of surgery  • omeprazole (PriLOSEC) 20 mg delayed release capsule Take day of surgery  • traZODone (DESYREL) 50 mg tablet Take night before surgery   • Trulicity 1 5 NF/5 7GX SOPN takes Q Thursday   Pre-op medication, and showering instructions with antibacteral soap reviewed  Pt  Verbalized understanding of current visitor restrictions  Pt  Verbalized an understanding of all instructions reviewed and offers no concerns at this time  Instructed to avoid all ASA/NSAIDs and OTC Vit/Supp from now until after surgery per anesthesia guidelines   Tylenol ok prn  DOS meds with a few sips of H2O

## 2022-11-21 NOTE — ANESTHESIA PREPROCEDURE EVALUATION
Procedure:  FACELIFT (Face)  BLEPHAROPLASTY LOWER (Bilateral: Eye)  BREAST IMPLANT EXCHANGE (Bilateral: Breast)  EXCISION OF ABDOMINAL SKIN (Abdomen)    Relevant Problems   CARDIO   (+) Essential hypertension   (+) Hyperlipidemia associated with type 2 diabetes mellitus (HCC)   (+) Other hemorrhoids      ENDO   (+) Hypothyroidism   (+) Type 2 diabetes mellitus without complication (HCC)      GI/HEPATIC   (+) Other dysphagia      NEURO/PSYCH   (+) RAMEZ (generalized anxiety disorder)   (+) Paresthesia of both hands      Endocrine   (+) Uncontrolled type 2 diabetes mellitus with hyperglycemia (HCC)      Nervous and Auditory   (+) 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      Musculoskeletal and Integument   (+) Displacement of lumbar intervertebral disc without myelopathy      Other   (+) Attention deficit hyperactivity disorder (ADHD), combined type   (+) Bilateral pseudophakia   (+) Bipolar I disorder, most recent episode depressed, mild (HCC)   (+) Snoring        Physical Exam    Airway    Mallampati score: II  TM Distance: <3 FB  Neck ROM: full     Dental   No notable dental hx     Cardiovascular  Cardiovascular exam normal    Pulmonary  Pulmonary exam normal     Other Findings        Anesthesia Plan  ASA Score- 2     Anesthesia Type- general with ASA Monitors  Additional Monitors:   Airway Plan: ETT  Plan Factors-Exercise tolerance (METS): >4 METS  Chart reviewed  Existing labs reviewed  Patient is not a current smoker  Patient not instructed to abstain from smoking on day of procedure  Patient did not smoke on day of surgery  Induction- intravenous  Postoperative Plan-     Informed Consent- Anesthetic plan and risks discussed with patient  I personally reviewed this patient with the CRNA  Discussed and agreed on the Anesthesia Plan with the CRNA  Landen Pope

## 2022-11-22 ENCOUNTER — HOSPITAL ENCOUNTER (OUTPATIENT)
Facility: HOSPITAL | Age: 67
Setting detail: OUTPATIENT SURGERY
Discharge: HOME | End: 2022-11-22
Attending: SURGERY | Admitting: SURGERY

## 2022-11-22 ENCOUNTER — ANESTHESIA (OUTPATIENT)
Dept: PERIOP | Facility: HOSPITAL | Age: 67
End: 2022-11-22

## 2022-11-22 VITALS
DIASTOLIC BLOOD PRESSURE: 76 MMHG | OXYGEN SATURATION: 98 % | WEIGHT: 137 LBS | SYSTOLIC BLOOD PRESSURE: 145 MMHG | TEMPERATURE: 97.7 F | RESPIRATION RATE: 20 BRPM | HEIGHT: 62 IN | HEART RATE: 80 BPM | BODY MASS INDEX: 25.21 KG/M2

## 2022-11-22 LAB
GLUCOSE SERPL-MCNC: 115 MG/DL (ref 65–140)
GLUCOSE SERPL-MCNC: 172 MG/DL (ref 65–140)

## 2022-11-22 DEVICE — SMOOTH ROUND MODERATE PLUS PROFILE GEL-FILLED BREAST IMPLANT, 350CC  SMOOTH ROUND SILICONE
Type: IMPLANTABLE DEVICE | Site: BREAST | Status: FUNCTIONAL
Brand: MENTOR MEMORYGEL BREAST IMPLANT

## 2022-11-22 RX ORDER — HYDROMORPHONE HCL IN WATER/PF 6 MG/30 ML
0.2 PATIENT CONTROLLED ANALGESIA SYRINGE INTRAVENOUS
Status: DISCONTINUED | OUTPATIENT
Start: 2022-11-22 | End: 2022-11-22 | Stop reason: HOSPADM

## 2022-11-22 RX ORDER — PROPOFOL 10 MG/ML
INJECTION, EMULSION INTRAVENOUS AS NEEDED
Status: DISCONTINUED | OUTPATIENT
Start: 2022-11-22 | End: 2022-11-22

## 2022-11-22 RX ORDER — ONDANSETRON 2 MG/ML
4 INJECTION INTRAMUSCULAR; INTRAVENOUS ONCE AS NEEDED
Status: DISCONTINUED | OUTPATIENT
Start: 2022-11-22 | End: 2022-11-22 | Stop reason: HOSPADM

## 2022-11-22 RX ORDER — DEXAMETHASONE SODIUM PHOSPHATE 10 MG/ML
INJECTION, SOLUTION INTRAMUSCULAR; INTRAVENOUS AS NEEDED
Status: DISCONTINUED | OUTPATIENT
Start: 2022-11-22 | End: 2022-11-22

## 2022-11-22 RX ORDER — HYDROMORPHONE HCL/PF 1 MG/ML
SYRINGE (ML) INJECTION AS NEEDED
Status: DISCONTINUED | OUTPATIENT
Start: 2022-11-22 | End: 2022-11-22

## 2022-11-22 RX ORDER — LIDOCAINE HYDROCHLORIDE 10 MG/ML
0.5 INJECTION, SOLUTION EPIDURAL; INFILTRATION; INTRACAUDAL; PERINEURAL ONCE AS NEEDED
Status: DISCONTINUED | OUTPATIENT
Start: 2022-11-22 | End: 2022-11-22 | Stop reason: HOSPADM

## 2022-11-22 RX ORDER — LIDOCAINE HYDROCHLORIDE 10 MG/ML
INJECTION, SOLUTION EPIDURAL; INFILTRATION; INTRACAUDAL; PERINEURAL AS NEEDED
Status: DISCONTINUED | OUTPATIENT
Start: 2022-11-22 | End: 2022-11-22 | Stop reason: HOSPADM

## 2022-11-22 RX ORDER — MINERAL OIL AND PETROLATUM 150; 830 MG/G; MG/G
OINTMENT OPHTHALMIC AS NEEDED
Status: DISCONTINUED | OUTPATIENT
Start: 2022-11-22 | End: 2022-11-22 | Stop reason: HOSPADM

## 2022-11-22 RX ORDER — FENTANYL CITRATE 50 UG/ML
INJECTION, SOLUTION INTRAMUSCULAR; INTRAVENOUS AS NEEDED
Status: DISCONTINUED | OUTPATIENT
Start: 2022-11-22 | End: 2022-11-22

## 2022-11-22 RX ORDER — NEOSTIGMINE METHYLSULFATE 1 MG/ML
INJECTION INTRAVENOUS AS NEEDED
Status: DISCONTINUED | OUTPATIENT
Start: 2022-11-22 | End: 2022-11-22

## 2022-11-22 RX ORDER — LIDOCAINE HYDROCHLORIDE 10 MG/ML
INJECTION, SOLUTION EPIDURAL; INFILTRATION; INTRACAUDAL; PERINEURAL AS NEEDED
Status: DISCONTINUED | OUTPATIENT
Start: 2022-11-22 | End: 2022-11-22

## 2022-11-22 RX ORDER — SODIUM CHLORIDE, SODIUM LACTATE, POTASSIUM CHLORIDE, CALCIUM CHLORIDE 600; 310; 30; 20 MG/100ML; MG/100ML; MG/100ML; MG/100ML
50 INJECTION, SOLUTION INTRAVENOUS CONTINUOUS
Status: DISCONTINUED | OUTPATIENT
Start: 2022-11-22 | End: 2022-11-22 | Stop reason: HOSPADM

## 2022-11-22 RX ORDER — CLINDAMYCIN PHOSPHATE 600 MG/50ML
600 INJECTION INTRAVENOUS ONCE
Status: COMPLETED | OUTPATIENT
Start: 2022-11-22 | End: 2022-11-22

## 2022-11-22 RX ORDER — SODIUM CHLORIDE, SODIUM LACTATE, POTASSIUM CHLORIDE, CALCIUM CHLORIDE 600; 310; 30; 20 MG/100ML; MG/100ML; MG/100ML; MG/100ML
75 INJECTION, SOLUTION INTRAVENOUS CONTINUOUS
Status: DISCONTINUED | OUTPATIENT
Start: 2022-11-22 | End: 2022-11-22 | Stop reason: HOSPADM

## 2022-11-22 RX ORDER — HYDROCODONE BITARTRATE AND ACETAMINOPHEN 5; 325 MG/1; MG/1
1 TABLET ORAL EVERY 4 HOURS PRN
Status: DISCONTINUED | OUTPATIENT
Start: 2022-11-22 | End: 2022-11-22 | Stop reason: HOSPADM

## 2022-11-22 RX ORDER — MAGNESIUM HYDROXIDE 1200 MG/15ML
LIQUID ORAL AS NEEDED
Status: DISCONTINUED | OUTPATIENT
Start: 2022-11-22 | End: 2022-11-22 | Stop reason: HOSPADM

## 2022-11-22 RX ORDER — GLYCOPYRROLATE 0.2 MG/ML
INJECTION INTRAMUSCULAR; INTRAVENOUS AS NEEDED
Status: DISCONTINUED | OUTPATIENT
Start: 2022-11-22 | End: 2022-11-22

## 2022-11-22 RX ORDER — SODIUM CHLORIDE, SODIUM LACTATE, POTASSIUM CHLORIDE, CALCIUM CHLORIDE 600; 310; 30; 20 MG/100ML; MG/100ML; MG/100ML; MG/100ML
INJECTION, SOLUTION INTRAVENOUS CONTINUOUS PRN
Status: DISCONTINUED | OUTPATIENT
Start: 2022-11-22 | End: 2022-11-22

## 2022-11-22 RX ORDER — ROCURONIUM BROMIDE 10 MG/ML
INJECTION, SOLUTION INTRAVENOUS AS NEEDED
Status: DISCONTINUED | OUTPATIENT
Start: 2022-11-22 | End: 2022-11-22

## 2022-11-22 RX ORDER — ACETAMINOPHEN 325 MG/1
650 TABLET ORAL EVERY 6 HOURS PRN
Status: DISCONTINUED | OUTPATIENT
Start: 2022-11-22 | End: 2022-11-22 | Stop reason: HOSPADM

## 2022-11-22 RX ORDER — BUPIVACAINE HYDROCHLORIDE AND EPINEPHRINE 5; 5 MG/ML; UG/ML
INJECTION, SOLUTION EPIDURAL; INTRACAUDAL; PERINEURAL AS NEEDED
Status: DISCONTINUED | OUTPATIENT
Start: 2022-11-22 | End: 2022-11-22 | Stop reason: HOSPADM

## 2022-11-22 RX ORDER — MIDAZOLAM HYDROCHLORIDE 2 MG/2ML
INJECTION, SOLUTION INTRAMUSCULAR; INTRAVENOUS AS NEEDED
Status: DISCONTINUED | OUTPATIENT
Start: 2022-11-22 | End: 2022-11-22

## 2022-11-22 RX ORDER — FENTANYL CITRATE/PF 50 MCG/ML
50 SYRINGE (ML) INJECTION
Status: DISCONTINUED | OUTPATIENT
Start: 2022-11-22 | End: 2022-11-22 | Stop reason: HOSPADM

## 2022-11-22 RX ORDER — MINERAL OIL
OIL (ML) MISCELLANEOUS AS NEEDED
Status: DISCONTINUED | OUTPATIENT
Start: 2022-11-22 | End: 2022-11-22 | Stop reason: HOSPADM

## 2022-11-22 RX ORDER — EPINEPHRINE 1 MG/ML
INJECTION, SOLUTION, CONCENTRATE INTRAVENOUS AS NEEDED
Status: DISCONTINUED | OUTPATIENT
Start: 2022-11-22 | End: 2022-11-22 | Stop reason: HOSPADM

## 2022-11-22 RX ORDER — ONDANSETRON 2 MG/ML
INJECTION INTRAMUSCULAR; INTRAVENOUS AS NEEDED
Status: DISCONTINUED | OUTPATIENT
Start: 2022-11-22 | End: 2022-11-22

## 2022-11-22 RX ADMIN — LIDOCAINE HYDROCHLORIDE 50 MG: 10 INJECTION, SOLUTION EPIDURAL; INFILTRATION; INTRACAUDAL at 07:36

## 2022-11-22 RX ADMIN — ROCURONIUM BROMIDE 50 MG: 10 INJECTION, SOLUTION INTRAVENOUS at 07:36

## 2022-11-22 RX ADMIN — HYDROMORPHONE HYDROCHLORIDE 0.25 MG: 1 INJECTION, SOLUTION INTRAMUSCULAR; INTRAVENOUS; SUBCUTANEOUS at 09:21

## 2022-11-22 RX ADMIN — PROPOFOL 200 MG: 10 INJECTION, EMULSION INTRAVENOUS at 07:36

## 2022-11-22 RX ADMIN — FENTANYL CITRATE 25 MCG: 50 INJECTION INTRAMUSCULAR; INTRAVENOUS at 12:15

## 2022-11-22 RX ADMIN — ROCURONIUM BROMIDE 20 MG: 10 INJECTION, SOLUTION INTRAVENOUS at 10:08

## 2022-11-22 RX ADMIN — GLYCOPYRROLATE 0.4 MG: 0.2 INJECTION, SOLUTION INTRAMUSCULAR; INTRAVENOUS at 12:50

## 2022-11-22 RX ADMIN — DEXAMETHASONE SODIUM PHOSPHATE 10 MG: 10 INJECTION, SOLUTION INTRAMUSCULAR; INTRAVENOUS at 07:41

## 2022-11-22 RX ADMIN — NEOSTIGMINE 3 MG: 1 INJECTION INTRAVENOUS at 12:50

## 2022-11-22 RX ADMIN — FENTANYL CITRATE 50 MCG: 50 INJECTION INTRAMUSCULAR; INTRAVENOUS at 10:08

## 2022-11-22 RX ADMIN — SODIUM CHLORIDE, POTASSIUM CHLORIDE, SODIUM LACTATE AND CALCIUM CHLORIDE 50 ML/HR: 600; 310; 30; 20 INJECTION, SOLUTION INTRAVENOUS at 06:46

## 2022-11-22 RX ADMIN — ROCURONIUM BROMIDE 20 MG: 10 INJECTION, SOLUTION INTRAVENOUS at 08:21

## 2022-11-22 RX ADMIN — HYDROMORPHONE HYDROCHLORIDE 0.25 MG: 1 INJECTION, SOLUTION INTRAMUSCULAR; INTRAVENOUS; SUBCUTANEOUS at 08:53

## 2022-11-22 RX ADMIN — CLINDAMYCIN IN 5 PERCENT DEXTROSE 600 MG: 12 INJECTION, SOLUTION INTRAVENOUS at 07:33

## 2022-11-22 RX ADMIN — SODIUM CHLORIDE, POTASSIUM CHLORIDE, SODIUM LACTATE AND CALCIUM CHLORIDE: 600; 310; 30; 20 INJECTION, SOLUTION INTRAVENOUS at 09:15

## 2022-11-22 RX ADMIN — FENTANYL CITRATE 50 MCG: 50 INJECTION INTRAMUSCULAR; INTRAVENOUS at 07:36

## 2022-11-22 RX ADMIN — SODIUM CHLORIDE, POTASSIUM CHLORIDE, SODIUM LACTATE AND CALCIUM CHLORIDE: 600; 310; 30; 20 INJECTION, SOLUTION INTRAVENOUS at 07:32

## 2022-11-22 RX ADMIN — ONDANSETRON 4 MG: 2 INJECTION INTRAMUSCULAR; INTRAVENOUS at 12:19

## 2022-11-22 RX ADMIN — MIDAZOLAM 2 MG: 1 INJECTION INTRAMUSCULAR; INTRAVENOUS at 07:32

## 2022-11-22 RX ADMIN — FENTANYL CITRATE 50 MCG: 50 INJECTION INTRAMUSCULAR; INTRAVENOUS at 08:23

## 2022-11-22 RX ADMIN — HYDROCODONE BITARTRATE AND ACETAMINOPHEN 1 TABLET: 5; 325 TABLET ORAL at 14:27

## 2022-11-22 RX ADMIN — ROCURONIUM BROMIDE 20 MG: 10 INJECTION, SOLUTION INTRAVENOUS at 09:33

## 2022-11-22 RX ADMIN — ONDANSETRON 4 MG: 2 INJECTION INTRAMUSCULAR; INTRAVENOUS at 07:41

## 2022-11-22 NOTE — OP NOTE
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]     * Blepharochalasis left lower eyelid [H02 35]     * 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]     * Excessive and redundant skin and subcutaneous tissue [L98 7]    Procedure(s) (LRB):  FACELIFT (N/A)  BLEPHAROPLASTY LOWER (Bilateral)  BREAST IMPLANT EXCHANGE (Bilateral)  EXCISION OF ABDOMINAL SKIN (N/A)    Specimen(s):  * No specimens in log *    Estimated Blood Loss:   Minimal    Drains:  [REMOVED] Urethral Catheter Latex 16 Fr  (Removed)   Collection Container Standard drainage bag [id number]   Number of days: 0       Anesthesia Type:   General    Operative Indications: Other hypertrophic disorders of the skin [L91 8]  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]  Blepharochalasis left lower eyelid [H02 35]  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]  Excessive and redundant skin and subcutaneous tissue [L98 7]      Operative Findings:      Complications:   None    Procedure and Technique:      The patient was identified as being the correct patient in the    operating room and placed in the supine position on the bed, intubated    by anesthesia, prepped and draped in the standard surgical fashion  30 cc of quarter percent Marcaine with epinephrine was injected into each breast     We first started by excising out her old scar in the inframammary fold    bilaterally and dissected down onto the capsule   The capsule was    incised  The old implants on both sides were then removed  The right side was deflatted, The left side hada capsular contracture  A complete    capsulectomy of the anterior posterior capsule was removed both sides,    re-elevated the pocket along the inferior medial margin and    disinserted the pectoralis major along the inferior margins up to the    3 and 9:00 positions on right and left breasts respectively obtaining    meticulous hemostasis, irrigated out the pockets with Betadine the saline     Solution  We then went ahead and placed a 350 cc moderate profile gel  implant into the right and the same implant into the left     We had good symmetry and shape of the breast  The tissue was then    closed with deep 2-0 Vicryl, 3-0 PDS, running subcuticular 3-0    Monocryl stitch  Attention was then turned to the abdomen  Tumescent solution was injected and suctioning was performed with a 3mm cannula  The central abdomen and flanks were treated  The abdominal skin was then excised and removed and closed with 2-0 and 3-0 vicryl and a running subcuticular suture of 3-0 monocryl  Dermabond dressing, bra and abdominal binder were applied  The table was then rotated  The patient was properly identified in the operating room and placed    in the supine position on the bed  She was prepped and draped in the    usual sterile surgical fashion and anesthetized with 1% lidocaine with    epinephrine  I first started by injecting the face and neck with 1% lidocaine with    epinephrine  The face was stated marked with a marking pen  I elevated bilateral pre and postauricular incisions  This was performed 15 blade  We then elevated the skin up off of the underlying smas and platysma bilaterally  Hemostasis was obtained  We plicated the lateral cheek, and neck skin with interrupted 3-0 PDS suture  Once this was done, the excess skin was pulled superiorly and posteriorly and redraped   The excess skin was then    removed with a #15 blade and electrocautery  I then closed with deep layer with 3-0 PDS   4-0 PDS  The skin was closed with 4-0 chromic, 6 0 chromic and 6-0 Prolene sutures  We had good symmetry after the facelift  The same procedure was performed on both sides which will be dictated once  We then turned our attention to the lower eyes  The lower eyes were    injected with 1% lidocaine with epinephrine  Corneal protectors were inserted  We made a skin only    incision elevating the skin inferiorly for approximately 8 mm  Once    this was done, we then dived down below the orbicularis ocular muscle medially  just above the orbital septum  We dissected all the way down to the    infraorbital rim  At this point, we opened up the orbital septum along    the inferior leading edge  The fat was then identified and conservatively excised from the medial, central and lateral compartments  The pretarsal and septal muslce was left intact    After    this was done, we obtained meticulous hemostasis  We redraped the    eyelid skin  We removed a 1-cm pinch excision of lower eyelid skin  The skin was then sutured to together with 6-0 nylon and 5-0 fast  chromic sutures  The    patient tolerated procedure well and was awakened from surgery,    dressed with antibiotic ointment and a facial dressing and taken to the recovery room in    stable condition  All counts were correct at the end of the procedure                          I was present for the entire procedure    Patient Disposition:  PACU         SIGNATURE: Charlie Gary MD  DATE: November 22, 2022  TIME: 1:00 PM

## 2022-11-22 NOTE — NURSING NOTE
Discharge instructions reviewed in detail with patient and friend who will be caring for and driving pt  Home   Supplies given and pain controlled

## 2022-11-22 NOTE — INTERVAL H&P NOTE
H&P reviewed  After examining the patient I find no changes in the patients condition since the H&P had been written      Vitals:    11/22/22 0624   BP: 131/59   Pulse: 88   Resp: 18   Temp: (!) 96 5 °F (35 8 °C)   SpO2: 96% [id number]

## 2022-11-22 NOTE — DISCHARGE SUMMARY
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]  Excessive and redundant skin and subcutaneous tissue [L98 7]  Principal Procedure:   FACELIFT (Face)  BLEPHAROPLASTY LOWER (Bilateral: Eye)  BREAST IMPLANT EXCHANGE (Bilateral: Breast)  EXCISION OF ABDOMINAL SKIN (Abdomen)  Discharge Medications:  See after visit summary for reconciled discharge medications provided to patient and family  Reason for Admission:  [facility] [place] [facility] to undergo the above named procedure on 11/22/2022 as an outpatient  Hospital Course:  Patient underwent the above named procedure on the day of admission without complications  They were discharged home the same day  Disposition:  To home in care of self and family    Condition:  Good  Follow up:  Patient with follow up in the office with Dr Shannan Batista MD in 1 week(s) or as scheduled per his office  Shannan Batista MD  11/22/2022 1:14 PM

## 2022-11-22 NOTE — ANESTHESIA POSTPROCEDURE EVALUATION
Post-Op Assessment Note    CV Status:  Stable    Pain management: satisfactory to patient     Mental Status:  Alert and awake   Hydration Status:  Stable   PONV Controlled:  None   Airway Patency:  Patent  Airway: intubated      Post Op Vitals Reviewed: Yes      Staff: CRNA         No notable events documented      /94 (11/22/22 1301)    Temp 97 7 °F (36 5 °C) (11/22/22 1301)    Pulse (!) 109 (11/22/22 1301)   Resp 16 (11/22/22 1301)    SpO2 97 % (11/22/22 1301)
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
## 2023-01-10 ENCOUNTER — OFFICE VISIT (OUTPATIENT)
Dept: PSYCHIATRY | Facility: CLINIC | Age: 68
End: 2023-01-10

## 2023-01-10 VITALS
DIASTOLIC BLOOD PRESSURE: 83 MMHG | HEART RATE: 89 BPM | SYSTOLIC BLOOD PRESSURE: 150 MMHG | HEIGHT: 61 IN | WEIGHT: 134 LBS | BODY MASS INDEX: 25.3 KG/M2

## 2023-01-10 DIAGNOSIS — G47.09 OTHER INSOMNIA: Chronic | ICD-10-CM

## 2023-01-10 DIAGNOSIS — F41.1 GAD (GENERALIZED ANXIETY DISORDER): Chronic | ICD-10-CM

## 2023-01-10 DIAGNOSIS — F90.2 ATTENTION DEFICIT HYPERACTIVITY DISORDER (ADHD), COMBINED TYPE: Chronic | ICD-10-CM

## 2023-01-10 DIAGNOSIS — F31.76 BIPOLAR I DISORDER, MOST RECENT EPISODE DEPRESSED, IN FULL REMISSION (HCC): Primary | Chronic | ICD-10-CM

## 2023-01-10 PROBLEM — K21.9 GASTROESOPHAGEAL REFLUX DISEASE WITHOUT ESOPHAGITIS: Status: ACTIVE | Noted: 2021-07-22

## 2023-01-10 PROBLEM — R03.0 ELEVATED BP WITHOUT DIAGNOSIS OF HYPERTENSION: Status: ACTIVE | Noted: 2022-10-10

## 2023-01-10 RX ORDER — METHYLPHENIDATE HYDROCHLORIDE 10 MG/1
10 TABLET ORAL 2 TIMES DAILY
Qty: 60 TABLET | Refills: 0 | Status: SHIPPED | OUTPATIENT
Start: 2023-01-21 | End: 2023-02-20

## 2023-01-10 RX ORDER — METHYLPHENIDATE HYDROCHLORIDE 10 MG/1
10 TABLET ORAL 2 TIMES DAILY
Qty: 60 TABLET | Refills: 0 | Status: SHIPPED | OUTPATIENT
Start: 2023-03-22 | End: 2023-04-21

## 2023-01-10 RX ORDER — BUPROPION HYDROCHLORIDE 150 MG/1
150 TABLET ORAL EVERY MORNING
Qty: 90 TABLET | Refills: 2 | Status: SHIPPED | OUTPATIENT
Start: 2023-01-10 | End: 2023-10-07

## 2023-01-10 RX ORDER — AZELASTINE 1 MG/ML
2 SPRAY, METERED NASAL 2 TIMES DAILY
COMMUNITY
Start: 2022-10-06

## 2023-01-10 RX ORDER — LAMOTRIGINE 150 MG/1
150 TABLET ORAL DAILY
Qty: 90 TABLET | Refills: 2 | Status: SHIPPED | OUTPATIENT
Start: 2023-01-10 | End: 2023-10-07

## 2023-01-10 RX ORDER — METHYLPHENIDATE HYDROCHLORIDE 10 MG/1
10 TABLET ORAL 2 TIMES DAILY
Qty: 60 TABLET | Refills: 0 | Status: SHIPPED | OUTPATIENT
Start: 2023-02-20 | End: 2023-03-22

## 2023-01-10 RX ORDER — TRAZODONE HYDROCHLORIDE 50 MG/1
50 TABLET ORAL
Qty: 90 TABLET | Refills: 2 | Status: SHIPPED | OUTPATIENT
Start: 2023-01-10 | End: 2023-10-07

## 2023-01-10 NOTE — BH TREATMENT PLAN
TREATMENT PLAN (Medication Management Only)        [facility]    Name/Date of Birth/MRN:  David Ware 76 y o  1955 MRN: [id number]  Date of Treatment Plan: January 10, 2023  Diagnosis/Diagnoses:   1  Bipolar I disorder, most recent episode depressed, in full remission ([facility] [address] )    2  RAMEZ (generalized anxiety disorder)    3  Attention deficit hyperactivity disorder (ADHD), combined type    4  Other insomnia      Strengths/Personal Resources for Self-Care: "I want to be as healthy as possible, motivation, I do know how to relax, I have a social life"  Area/Areas of need (in own words): "to find way that I can make my contribution to neighborhood committee"  1  Long Term Goal:   maintain control of anxiety, maintain control of depression, maintain mood stability, control ADHD symptoms  Target Date: 4 months - 5/10/2023  Person/Persons responsible for completion of goal: Sofia  2  Short Term Objective (s) - How will we reach this goal?:   A  Provider new recommended medication/dosage changes and/or continue medication(s): continue current medications as prescribed (Wellbutrin XL, Trazodone, Lamictal and Ritalin)  B   N/A   C   N/A  Target Date: 4 months - 5/10/2023  Person/Persons Responsible for Completion of Goal: Sofia   Progress Towards Goals: stable  Treatment Modality: medication management every 4 months  Review due 180 days from date of this plan: 6 months - 7/10/2023  Expected length of service: maintenance unless revised  My Physician/PA/NP and I have developed this plan together and I agree to work on the goals and objectives  I understand the treatment goals that were developed for my treatment    Electronic Signatures: on file (unless signed below)    Kyrie Lai MD 01/10/23

## 2023-04-19 DIAGNOSIS — F90.2 ATTENTION DEFICIT HYPERACTIVITY DISORDER (ADHD), COMBINED TYPE: Chronic | ICD-10-CM

## 2023-04-19 NOTE — TELEPHONE ENCOUNTER
Medication Refill Request     Name of Medication Methylphenidate   Dose/Frequency 10mg two times a day   Quantity FOR REVIEW  Verified pharmacy   [x]  Verified ordering Provider   [x]  Does patient have enough for the next 3 days? Yes [x] No []  Does patient have a follow-up appointment scheduled? Yes [x] No []   If so when is appointment: 5/24      The patient would like a 3 month supply if possible

## 2023-04-21 PROBLEM — H11.31 SUBCONJUNCTIVAL HEMORRHAGE OF RIGHT EYE: Status: ACTIVE | Noted: 2023-02-01

## 2023-04-21 PROBLEM — Z87.19 HISTORY OF GASTROESOPHAGEAL REFLUX (GERD): Status: ACTIVE | Noted: 2023-02-06

## 2023-04-21 PROBLEM — S52.509A CLOSED FRACTURE OF DISTAL END OF RADIUS: Status: ACTIVE | Noted: 2023-02-06

## 2023-04-21 PROBLEM — Z86.39 HISTORY OF HYPOTHYROIDISM: Status: ACTIVE | Noted: 2023-02-06

## 2023-04-21 PROBLEM — S69.92XA INJURY OF LEFT WRIST: Status: ACTIVE | Noted: 2023-02-06

## 2023-04-21 PROBLEM — F31.31 BIPOLAR I DISORDER, MOST RECENT EPISODE DEPRESSED, MILD (HCC): Status: ACTIVE | Noted: 2019-09-05

## 2023-04-21 PROBLEM — H35.371 EPIRETINAL MEMBRANE (ERM) OF RIGHT EYE: Status: ACTIVE | Noted: 2023-02-07

## 2023-04-21 PROBLEM — S06.0X0A CONCUSSION WITHOUT LOSS OF CONSCIOUSNESS: Status: ACTIVE | Noted: 2023-02-23

## 2023-04-21 RX ORDER — METHYLPHENIDATE HYDROCHLORIDE 10 MG/1
10 TABLET ORAL 2 TIMES DAILY
Qty: 60 TABLET | Refills: 0 | OUTPATIENT
Start: 2023-04-21 | End: 2023-05-21
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
## 2023-05-24 ENCOUNTER — OFFICE VISIT (OUTPATIENT)
Dept: PSYCHIATRY | Facility: CLINIC | Age: 68
End: 2023-05-24

## 2023-05-24 VITALS
WEIGHT: 137 LBS | BODY MASS INDEX: 25.86 KG/M2 | HEART RATE: 101 BPM | DIASTOLIC BLOOD PRESSURE: 80 MMHG | HEIGHT: 61 IN | SYSTOLIC BLOOD PRESSURE: 136 MMHG

## 2023-05-24 DIAGNOSIS — G47.09 OTHER INSOMNIA: Chronic | ICD-10-CM

## 2023-05-24 DIAGNOSIS — F90.2 ATTENTION DEFICIT HYPERACTIVITY DISORDER (ADHD), COMBINED TYPE: Chronic | ICD-10-CM

## 2023-05-24 DIAGNOSIS — F41.1 GAD (GENERALIZED ANXIETY DISORDER): Chronic | ICD-10-CM

## 2023-05-24 DIAGNOSIS — F31.76 BIPOLAR I DISORDER, MOST RECENT EPISODE DEPRESSED, IN REMISSION (HCC): Primary | Chronic | ICD-10-CM

## 2023-05-24 RX ORDER — METHYLPHENIDATE HYDROCHLORIDE 10 MG/1
10 TABLET ORAL 2 TIMES DAILY
Qty: 60 TABLET | Refills: 0 | Status: SHIPPED | OUTPATIENT
Start: 2023-07-23 | End: 2023-08-22

## 2023-05-24 RX ORDER — METHYLPHENIDATE HYDROCHLORIDE 10 MG/1
10 TABLET ORAL 2 TIMES DAILY
Qty: 60 TABLET | Refills: 0 | Status: SHIPPED | OUTPATIENT
Start: 2023-06-23 | End: 2023-07-23

## 2023-05-24 RX ORDER — TRAZODONE HYDROCHLORIDE 50 MG/1
50 TABLET ORAL
Qty: 90 TABLET | Refills: 2 | Status: SHIPPED | OUTPATIENT
Start: 2023-05-24 | End: 2024-02-18

## 2023-05-24 RX ORDER — METHYLPHENIDATE HYDROCHLORIDE 10 MG/1
10 TABLET ORAL 2 TIMES DAILY
Qty: 60 TABLET | Refills: 0 | Status: SHIPPED | OUTPATIENT
Start: 2023-05-24 | End: 2023-06-23

## 2023-05-24 NOTE — BH TREATMENT PLAN
"TREATMENT PLAN (Medication Management Only)        [facility]    Name/Date of Birth/MRN:  Beryle Skeens 76 y o  1955 MRN: [id number]  Date of Treatment Plan: May 24, 2023  Diagnosis/Diagnoses:   1  Bipolar I disorder, most recent episode depressed, in remission ([facility] [address] )    2  RAMEZ (generalized anxiety disorder)    3  Attention deficit hyperactivity disorder (ADHD), combined type    4  Other insomnia      Strengths/Personal Resources for Self-Care: \"I do know how to relax, I am a kind person\"  Area/Areas of need (in own words): \"try to have more routine\"  1  Long Term Goal:   maintain improvement in anxiety, maintain improvement in depression, maintain mood stability, control ADHD symptoms  Target Date: 3 months - 8/24/2023  Person/Persons responsible for completion of goal: Sofia  2  Short Term Objective (s) - How will we reach this goal?:   A  Provider new recommended medication/dosage changes and/or continue medication(s): continue current medications as prescribed (Trazodone, Lamictal and Ritalin)  B   N/A   C   N/A  Target Date: 3 months - 8/24/2023  Person/Persons Responsible for Completion of Goal: Sofia   Progress Towards Goals: stable  Treatment Modality: medication management every 3 months  Review due 180 days from date of this plan: 6 months - 11/24/2023  Expected length of service: maintenance unless revised  My Physician/PA/NP and I have developed this plan together and I agree to work on the goals and objectives  I understand the treatment goals that were developed for my treatment    Electronic Signatures: on file (unless signed below)    Sheree Last MD 05/24/23  "

## 2023-06-13 ENCOUNTER — HOSPITAL ENCOUNTER (OUTPATIENT)
Dept: RADIOLOGY | Age: 68
Discharge: HOME | End: 2023-06-13
Payer: MEDICARE

## 2023-06-13 VITALS — WEIGHT: 137 LBS | HEIGHT: 61 IN | BODY MASS INDEX: 25.86 KG/M2

## 2023-06-13 DIAGNOSIS — Z12.31 ENCOUNTER FOR SCREENING MAMMOGRAM FOR MALIGNANT NEOPLASM OF BREAST: ICD-10-CM

## 2023-06-13 PROCEDURE — 77067 SCR MAMMO BI INCL CAD: CPT

## 2023-06-13 PROCEDURE — 77063 BREAST TOMOSYNTHESIS BI: CPT

## 2023-06-16 ENCOUNTER — TELEPHONE (OUTPATIENT)
Dept: OBGYN CLINIC | Facility: CLINIC | Age: 68
End: 2023-06-16

## 2023-06-16 DIAGNOSIS — N64.89 BREAST ASYMMETRY: Primary | ICD-10-CM

## 2023-06-16 NOTE — TELEPHONE ENCOUNTER
----- Message from Tiffanie Fontanez DO sent at 6/15/2023  8:27 AM EDT -----  Please call the patient regarding her abnormal result  Inform patient needs additional views, diagnostic mammogram breast ultrasound for further evaluation

## 2023-06-16 NOTE — TELEPHONE ENCOUNTER
Patient's screening mammogram results 6/13/2023 - (R) breast asymmetry LOQ - recom diagnostic (R) breast mammogram & diagnostic (R) breast ultrasound  Lm patient's as

## 2023-06-19 NOTE — TELEPHONE ENCOUNTER
Patient informed of results & recommend follow up - she may check back with [facility] for cancellation for earlier appointment

## 2023-06-19 NOTE — TELEPHONE ENCOUNTER
Patient had returned call & lm as - recalled patient & lm as - scheduled for diagnostic (R) breast mammogram & (R) breast ultrasound 8/21/2023

## 2023-08-08 ENCOUNTER — TELEPHONE (OUTPATIENT)
Dept: PSYCHIATRY | Facility: CLINIC | Age: 68
End: 2023-08-08

## 2023-08-08 NOTE — TELEPHONE ENCOUNTER
Letter with medication list and last Progress Note printed and given to [employer] needs to sigh TERESA before the letter is given

## 2023-08-08 NOTE — TELEPHONE ENCOUNTER
Writer called and lvm for patient letting her know she needs to come into the office to fill out an TERESA to  the requested letter.

## 2023-08-08 NOTE — TELEPHONE ENCOUNTER
Called patient and informed letter and notes are located at the  for her to come in and retrieve them as well as sign TERESA. Letter and TERESA in  bin. Patient will be in first thing in the morning 8/9.

## 2023-08-08 NOTE — TELEPHONE ENCOUNTER
[place], she stated she needs a certified letter with letterhead and the doctors signature stating patient is prescribed these medications. She also needs a supporting document with her prescriptions on it with dosage & quantity. Patient needs this letter & document to cover the medications, ritalin, lamictal & trazodone.  Patient needs to  letter by Thursday morning 8/10/2023

## 2023-08-08 NOTE — TELEPHONE ENCOUNTER
Patient called regarding the letter. She stated she would come is as soon as it is complete. Please inform writer when complete to inform patient to come to the office to obtain and sign TERESA. Thank you.

## 2023-08-21 ENCOUNTER — HOSPITAL ENCOUNTER (OUTPATIENT)
Dept: ULTRASOUND IMAGING | Facility: CLINIC | Age: 68
Discharge: HOME | End: 2023-08-21
Payer: MEDICARE

## 2023-08-21 ENCOUNTER — HOSPITAL ENCOUNTER (OUTPATIENT)
Dept: MAMMOGRAPHY | Facility: CLINIC | Age: 68
Discharge: HOME | End: 2023-08-21
Payer: MEDICARE

## 2023-08-21 VITALS — BODY MASS INDEX: 25.86 KG/M2 | HEIGHT: 61 IN | WEIGHT: 137 LBS

## 2023-08-21 DIAGNOSIS — R92.8 ABNORMAL SCREENING MAMMOGRAM: ICD-10-CM

## 2023-08-21 PROCEDURE — 76642 ULTRASOUND BREAST LIMITED: CPT

## 2023-08-21 PROCEDURE — G0279 TOMOSYNTHESIS, MAMMO: HCPCS

## 2023-08-21 PROCEDURE — 77065 DX MAMMO INCL CAD UNI: CPT

## 2023-09-19 ENCOUNTER — ANNUAL EXAM (OUTPATIENT)
Dept: OBGYN CLINIC | Facility: CLINIC | Age: 68
End: 2023-09-19
Payer: MEDICARE

## 2023-09-19 VITALS
WEIGHT: 139 LBS | HEIGHT: 61 IN | SYSTOLIC BLOOD PRESSURE: 132 MMHG | BODY MASS INDEX: 26.24 KG/M2 | DIASTOLIC BLOOD PRESSURE: 78 MMHG

## 2023-09-19 DIAGNOSIS — Z51.81 MEDICATION MONITORING ENCOUNTER: ICD-10-CM

## 2023-09-19 DIAGNOSIS — Z12.31 ENCOUNTER FOR SCREENING MAMMOGRAM FOR BREAST CANCER: ICD-10-CM

## 2023-09-19 DIAGNOSIS — N95.2 VAGINAL ATROPHY: ICD-10-CM

## 2023-09-19 DIAGNOSIS — Z80.3 FAMILY HISTORY OF BREAST CANCER: ICD-10-CM

## 2023-09-19 DIAGNOSIS — R92.30 DENSE BREAST TISSUE ON MAMMOGRAM: ICD-10-CM

## 2023-09-19 DIAGNOSIS — Z01.419 ENCOUNTER FOR ANNUAL ROUTINE GYNECOLOGICAL EXAMINATION: Primary | ICD-10-CM

## 2023-09-19 PROBLEM — F31.76 BIPOLAR I DISORDER, MOST RECENT EPISODE DEPRESSED, IN REMISSION (HCC): Chronic | Status: RESOLVED | Noted: 2019-09-05 | Resolved: 2023-09-19

## 2023-09-19 PROCEDURE — G0101 CA SCREEN;PELVIC/BREAST EXAM: HCPCS | Performed by: OBSTETRICS & GYNECOLOGY

## 2023-09-19 RX ORDER — ESTRADIOL 10 UG/1
1 INSERT VAGINAL 2 TIMES WEEKLY
Qty: 24 TABLET | Refills: 3 | Status: SHIPPED | OUTPATIENT
Start: 2023-09-21

## 2023-09-19 NOTE — PROGRESS NOTES
Assessment/Plan:  Pap smear deferred due to low risk status. Encourage self breast examination as well as calcium supplementation. Continue annual 3D mammogram.  Reviewed automated breast ultrasound given breast density and family history of breast cancer. She will check to see if this is covered by her insurance. She will continue Vagifem 2 times per week. We also discussed over-the-counter vaginal moisturizer once weekly. All questions answered. She will continue to follow-up with primary care as scheduled. Return to office in 1 year or as needed  No problem-specific Assessment & Plan notes found for this encounter. Diagnoses and all orders for this visit:    Encounter for annual routine gynecological examination    Encounter for screening mammogram for breast cancer  -     Mammo screening bilateral w 3d & cad; Future    Dense breast tissue on mammogram  -     US breast screening bilateral complete (ABUS); Future    Family history of breast cancer  -     US breast screening bilateral complete (ABUS); Future    Vaginal atrophy  -     estradiol (Vagifem) 10 MCG TABS vaginal tablet; Insert 1 tablet (10 mcg total) into the vagina 2 (two) times a week    Medication monitoring encounter          Subjective:      Patient ID: Irene Silverio is a 76 y.o. female. HPI     This is a very pleasant 51-year-old female P3 ( x3, age 40, 43, 45) presents for GYN exam.  She went through menopause at age 48. She has never been on hormone replacement therapy. Her GYN history significant for vaginal hysterectomy at age 50 due to prolapse issues. She denies any vaginal bleeding or spotting. No changes in bowel or bladder function. She has been in a monogamous relationship for over 26 years. In the course of the year she underwent cosmetic surgery including replacement of ruptured breast implant, revision of contralateral breast, revision of abdominoplasty 2022.   She is doing well with vaginal estrogen 2 times per week but has noticed increase in dryness during intercourse. She has tried a lubricant with minimal improvement. The following portions of the patient's history were reviewed and updated as appropriate: allergies, current medications, past family history, past medical history, past social history, past surgical history and problem list.    Review of Systems   Constitutional: Negative for fatigue, fever and unexpected weight change. Respiratory: Negative for cough, chest tightness, shortness of breath and wheezing. Cardiovascular: Negative. Negative for chest pain and palpitations. Gastrointestinal: Negative. Negative for abdominal distention, abdominal pain, blood in stool, constipation, diarrhea, nausea and vomiting. Genitourinary: Negative. Negative for difficulty urinating, dyspareunia, dysuria, flank pain, frequency, genital sores, hematuria, pelvic pain, urgency, vaginal bleeding, vaginal discharge and vaginal pain. Skin: Negative for rash. Objective:      /78   Ht 5' 1" (1.549 m)   Wt 63 kg (139 lb)   BMI 26.26 kg/m²          Physical Exam  Constitutional:       Appearance: Normal appearance. She is well-developed. Cardiovascular:      Rate and Rhythm: Normal rate and regular rhythm. Pulmonary:      Effort: Pulmonary effort is normal.      Breath sounds: Normal breath sounds. Chest:   Breasts:     Right: No inverted nipple, mass, nipple discharge, skin change or tenderness. Left: No inverted nipple, mass, nipple discharge, skin change or tenderness. Abdominal:      General: Bowel sounds are normal. There is no distension. Palpations: Abdomen is soft. Tenderness: There is no abdominal tenderness. There is no guarding or rebound. Genitourinary:     Labia:         Right: No rash, tenderness or lesion. Left: No rash, tenderness or lesion. Vagina: Normal. No signs of injury. No vaginal discharge or tenderness. Uterus: Absent. Adnexa:         Right: No mass, tenderness or fullness. Left: No mass, tenderness or fullness. Neurological:      Mental Status: She is alert and oriented to person, place, and time. Psychiatric:         Behavior: Behavior normal.       Breast exam bilateral implants noted. Pelvic exam external genitalia is within normal limits. The vagina is evident of slight estrogen deficiency. The cervix is surgically absent. The cuff is well supported.
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
## 2023-10-25 ENCOUNTER — OFFICE VISIT (OUTPATIENT)
Dept: PSYCHIATRY | Facility: CLINIC | Age: 68
End: 2023-10-25
Payer: MEDICARE

## 2023-10-25 VITALS
SYSTOLIC BLOOD PRESSURE: 147 MMHG | HEART RATE: 101 BPM | HEIGHT: 61 IN | WEIGHT: 138 LBS | DIASTOLIC BLOOD PRESSURE: 71 MMHG | BODY MASS INDEX: 26.06 KG/M2

## 2023-10-25 DIAGNOSIS — F31.76 BIPOLAR I DISORDER, MOST RECENT EPISODE DEPRESSED, IN REMISSION (HCC): Primary | Chronic | ICD-10-CM

## 2023-10-25 DIAGNOSIS — F90.2 ATTENTION DEFICIT HYPERACTIVITY DISORDER (ADHD), COMBINED TYPE: Chronic | ICD-10-CM

## 2023-10-25 DIAGNOSIS — F41.1 GAD (GENERALIZED ANXIETY DISORDER): Chronic | ICD-10-CM

## 2023-10-25 DIAGNOSIS — G47.09 OTHER INSOMNIA: Chronic | ICD-10-CM

## 2023-10-25 PROBLEM — M65.341 ACQUIRED TRIGGER FINGER OF RIGHT RING FINGER: Status: ACTIVE | Noted: 2023-09-27

## 2023-10-25 PROBLEM — M21.612 BUNION OF LEFT FOOT: Status: ACTIVE | Noted: 2023-09-06

## 2023-10-25 PROBLEM — M20.20 ACQUIRED HALLUX RIGIDUS: Status: ACTIVE | Noted: 2023-09-14

## 2023-10-25 PROBLEM — R25.3 FASCICULATION OF LOWER EXTREMITY: Status: ACTIVE | Noted: 2023-09-06

## 2023-10-25 PROBLEM — Z80.3 FAMILY HISTORY OF BREAST CANCER: Status: ACTIVE | Noted: 2018-09-25

## 2023-10-25 PROBLEM — H34.9: Status: ACTIVE | Noted: 2023-07-24

## 2023-10-25 PROBLEM — R20.0 NUMBNESS OF HAND: Status: ACTIVE | Noted: 2021-04-26

## 2023-10-25 PROBLEM — K76.0 FATTY LIVER: Status: ACTIVE | Noted: 2023-09-06

## 2023-10-25 PROCEDURE — 99214 OFFICE O/P EST MOD 30 MIN: CPT | Performed by: PSYCHIATRY & NEUROLOGY

## 2023-10-25 PROCEDURE — 90833 PSYTX W PT W E/M 30 MIN: CPT | Performed by: PSYCHIATRY & NEUROLOGY

## 2023-10-25 RX ORDER — METHYLPHENIDATE HYDROCHLORIDE 10 MG/1
10 TABLET ORAL 2 TIMES DAILY
Qty: 60 TABLET | Refills: 0 | Status: SHIPPED | OUTPATIENT
Start: 2024-01-16 | End: 2024-02-15

## 2023-10-25 RX ORDER — LAMOTRIGINE 200 MG/1
200 TABLET ORAL DAILY
Qty: 90 TABLET | Refills: 2 | Status: SHIPPED | OUTPATIENT
Start: 2023-10-25 | End: 2024-07-21

## 2023-10-25 RX ORDER — METHYLPHENIDATE HYDROCHLORIDE 10 MG/1
10 TABLET ORAL 2 TIMES DAILY
Qty: 60 TABLET | Refills: 0 | Status: SHIPPED | OUTPATIENT
Start: 2023-12-17 | End: 2024-01-16

## 2023-10-25 RX ORDER — TRAZODONE HYDROCHLORIDE 50 MG/1
50 TABLET ORAL
Qty: 90 TABLET | Refills: 2 | Status: SHIPPED | OUTPATIENT
Start: 2023-10-25 | End: 2024-07-21

## 2023-10-25 RX ORDER — METHYLPHENIDATE HYDROCHLORIDE 10 MG/1
10 TABLET ORAL 2 TIMES DAILY
Qty: 60 TABLET | Refills: 0 | Status: SHIPPED | OUTPATIENT
Start: 2023-11-17 | End: 2023-12-17

## 2023-10-25 NOTE — BH TREATMENT PLAN
TREATMENT PLAN (Medication Management Only)        [address] [facility]    Name/Date of Birth/MRN:  Emre Kearns 76 y.o. 1955 MRN: [id number]  Date of Treatment Plan: October 25, 2023  Diagnosis/Diagnoses:   1. Bipolar I disorder, most recent episode depressed, in remission ([facility])    2. RAMEZ (generalized anxiety disorder)    3. Attention deficit hyperactivity disorder (ADHD), combined type    4. Other insomnia      Strengths/Personal Resources for Self-Care: "I am open to hearing suggestions and I want to be healthy". Area/Areas of need (in own words): "I would like to be a little more outgoing". 1. Long Term Goal:   maintain control of anxiety, maintain mood stability, control ADHD symptoms  Target Date: 4 months - 2/25/2024  Person/Persons responsible for completion of goal: Sofia  2. Short Term Objective (s) - How will we reach this goal?:   A. Provider new recommended medication/dosage changes and/or continue medication(s): continue current medications as prescribed (Trazodone, Lamictal, and Ritalin). B.  N/A.  C.  N/A. Target Date: 4 months - 2/25/2024  Person/Persons Responsible for Completion of Goal: Sofia   Progress Towards Goals: stable  Treatment Modality: medication management every 4 months  Review due 180 days from date of this plan: 6 months - 4/25/2024  Expected length of service: maintenance unless revised  My Physician/PA/NP and I have developed this plan together and I agree to work on the goals and objectives. I understand the treatment goals that were developed for my treatment.   Electronic Signatures: on file (unless signed below)    Alcon Stephenson MD 10/25/23

## 2023-12-27 DIAGNOSIS — F90.2 ATTENTION DEFICIT HYPERACTIVITY DISORDER (ADHD), COMBINED TYPE: Chronic | ICD-10-CM

## 2023-12-27 RX ORDER — METHYLPHENIDATE HYDROCHLORIDE 10 MG/1
10 TABLET ORAL 2 TIMES DAILY
Qty: 60 TABLET | Refills: 0 | OUTPATIENT
Start: 2023-12-27 | End: 2024-01-26

## 2023-12-27 NOTE — TELEPHONE ENCOUNTER
Medication Refill Request     Name of Medication ritalin  Dose/Frequency 10mg take 1 tablet by mouth 2 times a day  Quantity 60  Verified pharmacy   [x]  Verified ordering Provider   [x]  Does patient have enough for the next 3 days? Yes [] No [x]  Does patient have a follow-up appointment scheduled? Yes [x] No []   If so when is appointment: 2/28/2024 2pm

## 2024-01-27 ENCOUNTER — TELEPHONE (OUTPATIENT)
Dept: OTHER | Facility: OTHER | Age: 69
End: 2024-01-27
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
## 2024-02-28 ENCOUNTER — OFFICE VISIT (OUTPATIENT)
Dept: PSYCHIATRY | Facility: CLINIC | Age: 69
End: 2024-02-28
Payer: MEDICARE

## 2024-02-28 VITALS
HEIGHT: 61 IN | BODY MASS INDEX: 26.24 KG/M2 | HEART RATE: 114 BPM | DIASTOLIC BLOOD PRESSURE: 90 MMHG | SYSTOLIC BLOOD PRESSURE: 143 MMHG | WEIGHT: 139 LBS

## 2024-02-28 DIAGNOSIS — F31.76 BIPOLAR I DISORDER, MOST RECENT EPISODE DEPRESSED, IN REMISSION (HCC): Primary | Chronic | ICD-10-CM

## 2024-02-28 DIAGNOSIS — F90.2 ATTENTION DEFICIT HYPERACTIVITY DISORDER (ADHD), COMBINED TYPE: Chronic | ICD-10-CM

## 2024-02-28 DIAGNOSIS — G47.09 OTHER INSOMNIA: Chronic | ICD-10-CM

## 2024-02-28 DIAGNOSIS — F41.1 GAD (GENERALIZED ANXIETY DISORDER): Chronic | ICD-10-CM

## 2024-02-28 PROBLEM — L85.3 DRY SKIN DERMATITIS: Status: ACTIVE | Noted: 2023-12-06

## 2024-02-28 PROCEDURE — 90833 PSYTX W PT W E/M 30 MIN: CPT | Performed by: PSYCHIATRY & NEUROLOGY

## 2024-02-28 PROCEDURE — G2211 COMPLEX E/M VISIT ADD ON: HCPCS | Performed by: PSYCHIATRY & NEUROLOGY

## 2024-02-28 PROCEDURE — 99214 OFFICE O/P EST MOD 30 MIN: CPT | Performed by: PSYCHIATRY & NEUROLOGY

## 2024-02-28 RX ORDER — METHYLPHENIDATE HYDROCHLORIDE 10 MG/1
10 TABLET ORAL 2 TIMES DAILY
Qty: 60 TABLET | Refills: 0 | Status: SHIPPED | OUTPATIENT
Start: 2024-02-28 | End: 2024-03-29

## 2024-02-28 RX ORDER — METHYLPHENIDATE HYDROCHLORIDE 10 MG/1
10 TABLET ORAL 2 TIMES DAILY
Qty: 60 TABLET | Refills: 0 | Status: SHIPPED | OUTPATIENT
Start: 2024-04-28 | End: 2024-05-28

## 2024-02-28 RX ORDER — METHYLPHENIDATE HYDROCHLORIDE 10 MG/1
10 TABLET ORAL 2 TIMES DAILY
Qty: 60 TABLET | Refills: 0 | Status: SHIPPED | OUTPATIENT
Start: 2024-03-29 | End: 2024-04-28

## 2024-02-28 RX ORDER — IBUPROFEN 800 MG/1
800 TABLET ORAL
COMMUNITY
Start: 2023-12-22

## 2024-02-28 NOTE — BH TREATMENT PLAN
"TREATMENT PLAN (Medication Management Only)        [facility]    Name/Date of Birth/MRN:  Sofia Lopez 69 y.o. 1955 MRN: [id number]  Date of Treatment Plan: February 28, 2024  Diagnosis/Diagnoses:   1. Bipolar I disorder, most recent episode depressed, in remission (HCC)    2. RAMEZ (generalized anxiety disorder)    3. Attention deficit hyperactivity disorder (ADHD), combined type    4. Other insomnia      Strengths/Personal Resources for Self-Care: \"I can ask for help when I need it, I read, I reach out to people\".  Area/Areas of need (in own words): \"I need some kind social skills to be able to handle things\".  1. Long Term Goal:   improve control of anxiety, maintain control of depression, control ADHD symptoms  Target Date: 4 months - 6/28/2024  Person/Persons responsible for completion of goal: Sofia  2.  Short Term Objective (s) - How will we reach this goal?:   A.  Provider new recommended medication/dosage changes and/or continue medication(s): continue current medications as prescribed (Trazodone, Lamictal, and Ritalin).  B.  N/A.  C.  N/A.  Target Date: 4 months - 6/28/2024  Person/Persons Responsible for Completion of Goal: Sofia   Progress Towards Goals: moderate progress  Treatment Modality: medication management every 4 months, referral for individual psychotherapy  Review due 180 days from date of this plan: 6 months - 8/28/2024  Expected length of service: ongoing treatment unless revised  My Physician/PA/NP and I have developed this plan together and I agree to work on the goals and objectives. I understand the treatment goals that were developed for my treatment.  Electronic Signatures: on file (unless signed below)    Mariella Green MD 02/28/24  "

## 2024-02-28 NOTE — BH CRISIS PLAN
Client Name: Sofia Lopez       Client YOB: 1955    [facility] Safety Plan      Creation Date: 2/28/24 Update Date: 2/28/24   Created By: Mariella Green MD Last Updated By: Mariella Green MD      Step 1: Warning Signs:   Warning Signs   anxiety, fear. need to get help, therapy            Step 2: Internal Coping Strategies:   Internal Coping Strategies   puzzles, word games, movies            Step 3: People and social settings that provide distraction:   Name Contact Information   Friend Maria L Apple in my cell phone    in my cell phone            Step 4: People whom I can ask for help during a crisis:      Name Contact Information    Dr. Green in my cell phone    Dr. Machado (PCP) in my cell phone      Step 5: Professionals or agencies I can contact during a crisis:      [facility]inican/Agency Name Phone Emergency Contact    [facility] [phone]       Local Emergency Department Emergency Department Phone Emergency Department Address    [facility] 911         Crisis Phone Numbers:   Suicide Prevention Lifeline: Call or Text  214 Crisis Text Line: Text HOME to 010-943   Please note: Some [facility] do not have a separate number for Child/Adolescent specific crisis. If your county is not listed under Child/Adolescent, please call the adult number for your county      Adult Crisis Numbers: Child/Adolescent Crisis Numbers   [facility]: [phone] [facility]: [phone]   [facility]: [phone] [facility]: [phone]   [facility]: [phone] [place]: [phone]   [facility]: [phone] [place]: [phone]   [place] [facility]: [phone]   [facility]: [phone]   [facility]: [phone]   [facility]: [phone] (daytime) [phone] (after hours, weekends, holidays)      Step 6: Making the environment safer (plan for lethal means safety):    Patient did not identify any lethal methods: Yes     Optional: What is most important to me and worth living for?   My family     [place] Safety Plan. Jes Nesbitt and Fidel Andino. Used with permission of the authors.

## 2024-06-23 ENCOUNTER — TELEPHONE (OUTPATIENT)
Dept: OTHER | Facility: OTHER | Age: 69
End: 2024-06-23

## 2024-06-23 NOTE — TELEPHONE ENCOUNTER
Pt needs to reschedule her appt on 6/25 because she has surgery scheduled for that same day early in the morning and is not allowed to do anything else after the surgery. Is asking if someone can please call her back tomorrow to reschedule her appt?

## 2024-06-24 ENCOUNTER — TELEPHONE (OUTPATIENT)
Dept: PSYCHIATRY | Facility: CLINIC | Age: 69
End: 2024-06-24

## 2024-06-24 NOTE — TELEPHONE ENCOUNTER
Called and left message for patient to follow up. Patient has appt 7/25 and writer is unsure if this is the correct appt that needs rescheduling. Asked patient to return call to [phone] for assistance.

## 2024-06-24 NOTE — TELEPHONE ENCOUNTER
Patient is calling regarding cancelling an appointment.    Date/Time: 7/25/2024  at 3:30 pm    Reason: patient is having surgery this day    Patient was rescheduled: YES [x] NO []  If yes, when was Patient reschedule for: 8/1/2024 at 2:30 pm    Patient requesting call back to reschedule: YES [] NO [x]

## 2024-07-04 DIAGNOSIS — F31.76 BIPOLAR I DISORDER, MOST RECENT EPISODE DEPRESSED, IN REMISSION (HCC): Primary | Chronic | ICD-10-CM

## 2024-07-04 RX ORDER — LAMOTRIGINE 200 MG/1
200 TABLET ORAL DAILY
Qty: 90 TABLET | Refills: 0 | Status: SHIPPED | OUTPATIENT
Start: 2024-07-04 | End: 2024-10-02

## 2024-07-09 DIAGNOSIS — F90.2 ATTENTION DEFICIT HYPERACTIVITY DISORDER (ADHD), COMBINED TYPE: Chronic | ICD-10-CM

## 2024-07-09 RX ORDER — METHYLPHENIDATE HYDROCHLORIDE 10 MG/1
10 TABLET ORAL 2 TIMES DAILY
Qty: 60 TABLET | Refills: 0 | Status: SHIPPED | OUTPATIENT
Start: 2024-07-09 | End: 2024-08-08

## 2024-07-09 NOTE — TELEPHONE ENCOUNTER
Medication Refill Request     Name of Medication Ritalin  Dose/Frequency 10mg  Quantity 60  Verified pharmacy   [x]  Verified ordering Provider   [x]  Does patient have enough for the next 3 days? Yes [x] No []  Does patient have a follow-up appointment scheduled? Yes [x] No []   If so when is appointment: 8/1/24

## 2024-07-09 NOTE — TELEPHONE ENCOUNTER
Medication Refill Request     Name methylphenidate    Dose/Frequency Take 1 tablet (10 mg total) by mouth 2 (two) times a day   Quantity 60 tablets  Verified pharmacy   [x]  Verified ordering Provider   [x]  Does patient have enough for the next 3 days? Yes [] No [x]

## 2024-07-21 RX ORDER — METHYLPHENIDATE HYDROCHLORIDE 10 MG/1
10 TABLET ORAL 2 TIMES DAILY
Qty: 60 TABLET | Refills: 0 | OUTPATIENT
Start: 2024-07-21 | End: 2024-08-20
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
## 2024-08-01 ENCOUNTER — OFFICE VISIT (OUTPATIENT)
Dept: PSYCHIATRY | Facility: CLINIC | Age: 69
End: 2024-08-01
Payer: MEDICARE

## 2024-08-01 VITALS
SYSTOLIC BLOOD PRESSURE: 139 MMHG | WEIGHT: 136 LBS | BODY MASS INDEX: 25.68 KG/M2 | HEIGHT: 61 IN | DIASTOLIC BLOOD PRESSURE: 73 MMHG | HEART RATE: 105 BPM

## 2024-08-01 DIAGNOSIS — F90.2 ATTENTION DEFICIT HYPERACTIVITY DISORDER (ADHD), COMBINED TYPE: Chronic | ICD-10-CM

## 2024-08-01 DIAGNOSIS — G47.09 OTHER INSOMNIA: Chronic | ICD-10-CM

## 2024-08-01 DIAGNOSIS — F41.1 GAD (GENERALIZED ANXIETY DISORDER): Chronic | ICD-10-CM

## 2024-08-01 DIAGNOSIS — F31.76 BIPOLAR I DISORDER, MOST RECENT EPISODE DEPRESSED, IN REMISSION (HCC): Primary | Chronic | ICD-10-CM

## 2024-08-01 PROCEDURE — 99214 OFFICE O/P EST MOD 30 MIN: CPT | Performed by: PSYCHIATRY & NEUROLOGY

## 2024-08-01 PROCEDURE — 90833 PSYTX W PT W E/M 30 MIN: CPT | Performed by: PSYCHIATRY & NEUROLOGY

## 2024-08-01 PROCEDURE — G2211 COMPLEX E/M VISIT ADD ON: HCPCS | Performed by: PSYCHIATRY & NEUROLOGY

## 2024-08-01 RX ORDER — METHYLPHENIDATE HYDROCHLORIDE 10 MG/1
10 TABLET ORAL 2 TIMES DAILY
Qty: 60 TABLET | Refills: 0 | Status: SHIPPED | OUTPATIENT
Start: 2024-10-07 | End: 2024-11-06

## 2024-08-01 RX ORDER — METHYLPHENIDATE HYDROCHLORIDE 10 MG/1
10 TABLET ORAL 2 TIMES DAILY
Qty: 60 TABLET | Refills: 0 | Status: SHIPPED | OUTPATIENT
Start: 2024-08-08 | End: 2024-09-07

## 2024-08-01 RX ORDER — METHYLPHENIDATE HYDROCHLORIDE 10 MG/1
10 TABLET ORAL 2 TIMES DAILY
Qty: 60 TABLET | Refills: 0 | Status: SHIPPED | OUTPATIENT
Start: 2024-08-31 | End: 2024-09-30

## 2024-08-01 RX ORDER — TRAZODONE HYDROCHLORIDE 50 MG/1
50 TABLET ORAL
Qty: 90 TABLET | Refills: 2 | Status: SHIPPED | OUTPATIENT
Start: 2024-08-01 | End: 2025-04-28

## 2024-08-01 RX ORDER — LAMOTRIGINE 200 MG/1
200 TABLET ORAL DAILY
Qty: 90 TABLET | Refills: 2 | Status: SHIPPED | OUTPATIENT
Start: 2024-08-01 | End: 2025-04-28

## 2024-08-01 NOTE — BH TREATMENT PLAN
"TREATMENT PLAN (Medication Management Only)        [facility]    Name/Date of Birth/MRN:  Sofia Lopez 69 y.o. 1955 MRN: [id number]  Date of Treatment Plan: August 1, 2024  Diagnosis/Diagnoses:   1. Bipolar I disorder, most recent episode depressed, in remission (HCC)    2. RAMEZ (generalized anxiety disorder)    3. Attention deficit hyperactivity disorder (ADHD), combined type    4. Other insomnia      Strengths/Personal Resources for Self-Care: \"I am willing to go to therapy and talk about psychiatric issues, I read a lot\".  Area/Areas of need (in own words): \"just maintaining it\".  1. Long Term Goal:   maintain improvement in anxiety, maintain improvement in depression, maintain mood stability, control ADHD symptoms  Target Date: 4 months - 12/1/2024  Person/Persons responsible for completion of goal: Sofia  2.  Short Term Objective (s) - How will we reach this goal?:   A.  Provider new recommended medication/dosage changes and/or continue medication(s): continue current medications as prescribed (Trazodone, Lamictal, and Ritalin).  B.  N/A.  C.  N/A.  Target Date: 4 months - 12/1/2024  Person/Persons Responsible for Completion of Goal: Sofia   Progress Towards Goals: progressing  Treatment Modality: medication management every 4 months  Review due 180 days from date of this plan: 6 months - 2/1/2025  Expected length of service: maintenance unless revised  My Physician/PA/NP and I have developed this plan together and I agree to work on the goals and objectives. I understand the treatment goals that were developed for my treatment.  Electronic Signatures: on file (unless signed below)    Mariella Green MD 08/01/24  "

## 2024-09-24 ENCOUNTER — ANNUAL EXAM (OUTPATIENT)
Dept: OBGYN CLINIC | Facility: CLINIC | Age: 69
End: 2024-09-24
Payer: MEDICARE

## 2024-09-24 VITALS
HEIGHT: 61 IN | WEIGHT: 138.4 LBS | BODY MASS INDEX: 26.13 KG/M2 | SYSTOLIC BLOOD PRESSURE: 142 MMHG | DIASTOLIC BLOOD PRESSURE: 82 MMHG

## 2024-09-24 DIAGNOSIS — Z12.31 ENCOUNTER FOR SCREENING MAMMOGRAM FOR BREAST CANCER: ICD-10-CM

## 2024-09-24 DIAGNOSIS — Z01.419 ENCOUNTER FOR ANNUAL ROUTINE GYNECOLOGICAL EXAMINATION: Primary | ICD-10-CM

## 2024-09-24 DIAGNOSIS — N94.10 DYSPAREUNIA, FEMALE: ICD-10-CM

## 2024-09-24 DIAGNOSIS — N95.2 VAGINAL ATROPHY: ICD-10-CM

## 2024-09-24 PROCEDURE — G0101 CA SCREEN;PELVIC/BREAST EXAM: HCPCS | Performed by: OBSTETRICS & GYNECOLOGY

## 2024-09-24 NOTE — PROGRESS NOTES
Ambulatory Visit  Name: Sofia Lopez      : 1955      MRN: [id number]  Encounter Provider: Bertha Mascorro DO  Encounter Date: 2024   Encounter department: [facility]    Assessment & Plan  Encounter for annual routine gynecological examination         Encounter for screening mammogram for breast cancer         Vaginal atrophy    Orders:    Ospemifene 60 MG TABS; Take 1 tablet (60 mg total) by mouth in the morning    Dyspareunia, female    Orders:    Ospemifene 60 MG TABS; Take 1 tablet (60 mg total) by mouth in the morning    Pap smear deferred due to low risk status.  Encouraged self breast examination as well as calcium supplementation.  Continue annual mammogram.  Reviewed colon cancer screening, up-to-date.  Discussed treatment options for vaginal atrophy/dyspareunia (including Estrace vaginal cream, intrarosa, osphena).  She would like to try Osphena and will check to see if her prescription plan covers.  Discussed the risks and benefits.  All questions answered.  She will call with update in 3 months.    History of Present Illness     Sofia Lopez is a 69 y.o. female who presents     This is a pleasant 69-year-old female P3 ( x 3) presents for her GYN exam. She went through menopause at age 50. She has never been on hormone replacement therapy. Her GYN history significant for vaginal hysterectomy at age 48 due to prolapse issues.  She denies any vaginal bleeding or spotting.  No changes in bowel or bladder function.  She has been in a monogamous relationship for over 27 years.  She has had significant vaginal dryness, discomfort with intercourse refractory to Vagifem and vaginal moisturizers.  She is looking for alternative treatments.    Colon 10/2020    Mammo 2023 w Dx Mammo b/l and Rt US, scheduled     Dexa 2024 osteopenia      History obtained from : patient  Review of Systems   Constitutional:  Negative for fatigue, fever and unexpected weight change.    Respiratory:  Negative for cough, chest tightness, shortness of breath and wheezing.    Cardiovascular: Negative.  Negative for chest pain and palpitations.   Gastrointestinal: Negative.  Negative for abdominal distention, abdominal pain, blood in stool, constipation, diarrhea, nausea and vomiting.   Genitourinary: Negative.  Negative for difficulty urinating, dyspareunia, dysuria, flank pain, frequency, genital sores, hematuria, pelvic pain, urgency, vaginal bleeding, vaginal discharge and vaginal pain.   Skin:  Negative for rash.     Current Outpatient Medications on File Prior to Visit   Medication Sig Dispense Refill    Accu-Chek FastClix Lancets MISC 4 (four) times a day Test      Accu-Chek SmartView test strip 4 (four) times a day Test      Alcohol Swabs (Alcohol Prep) 70 % PADS       atorvastatin (LIPITOR) 20 mg tablet Take 20 mg by mouth every morning      BD Pen Needle Perlita U/F 32G X 4 MM MISC       BIOTIN PO Hair, Skin and Nails (biotin)      cholecalciferol (VITAMIN D3) 400 units tablet Take 400 Units by mouth daily      COLLAGEN PO Take by mouth      ibuprofen (MOTRIN) 800 mg tablet Take 800 mg by mouth      lamoTRIgine (LaMICtal) 200 MG tablet Take 1 tablet (200 mg total) by mouth daily 90 tablet 2    Lantus SoloStar 100 units/mL injection pen Inject 18 Units under the skin daily before lunch      levothyroxine 88 mcg tablet Take 88 mcg by mouth every morning      metFORMIN (GLUCOPHAGE) 1000 MG tablet TAKE 1 TABLET BY MOUTH 2 TIMES A DAY WITH MEALS.      [START ON 10/7/2024] methylphenidate (Ritalin) 10 mg tablet Take 1 tablet (10 mg total) by mouth 2 (two) times a day Max Daily Amount: 20 mg Do not start before October 7, 2024. 60 tablet 0    Multiple Vitamin (MULTIVITAMIN ADULT PO) multivitamin      omeprazole (PriLOSEC) 20 mg delayed release capsule Take 20 mg by mouth daily      SITagliptin Phosphate (JANUVIA PO) Take by mouth daily after breakfast      traZODone (DESYREL) 50 mg tablet Take 1  "tablet (50 mg total) by mouth daily at bedtime as needed for sleep 90 tablet 2    [DISCONTINUED] estradiol (Vagifem) 10 MCG TABS vaginal tablet Insert 1 tablet (10 mcg total) into the vagina 2 (two) times a week 24 tablet 3    methylphenidate (Ritalin) 10 mg tablet Take 1 tablet (10 mg total) by mouth 2 (two) times a day Earlier fill [place] 1 to 15, 2024 Max Daily Amount: 20 mg Do not start before August 31, 2024. (Patient not taking: Reported on 9/24/2024) 60 tablet 0    methylphenidate (Ritalin) 10 mg tablet Take 1 tablet (10 mg total) by mouth 2 (two) times a day Max Daily Amount: 20 mg Do not start before August 8, 2024. 60 tablet 0    Trulicity 1.5 MG/0.5ML SOPN once a week (Patient not taking: Reported on 8/1/2024)       No current facility-administered medications on file prior to visit.          Objective     /82   Ht 5' 1\" (1.549 m)   Wt 62.8 kg (138 lb 6.4 oz)   BMI 26.15 kg/m²     Physical Exam  Constitutional:       Appearance: Normal appearance. She is well-developed.   HENT:      Head: Normocephalic and atraumatic.   Cardiovascular:      Rate and Rhythm: Normal rate and regular rhythm.   Pulmonary:      Effort: Pulmonary effort is normal.      Breath sounds: Normal breath sounds.   Chest:   Breasts:     Right: No inverted nipple, mass, nipple discharge, skin change or tenderness.      Left: No inverted nipple, mass, nipple discharge, skin change or tenderness.   Abdominal:      General: Bowel sounds are normal. There is no distension.      Palpations: Abdomen is soft.      Tenderness: There is no abdominal tenderness. There is no guarding or rebound.   Genitourinary:     Labia:         Right: No rash, tenderness or lesion.         Left: No rash, tenderness or lesion.       Vagina: Normal. No signs of injury. No vaginal discharge or tenderness.      Uterus: Absent.       Adnexa:         Right: No mass, tenderness or fullness.          Left: No mass, " tenderness or fullness.     Neurological:      Mental Status: She is alert.   Psychiatric:         Behavior: Behavior normal.     External genitalia is within normal limits.  The vagina is evident of estrogen deficiency.  The cervix is surgically absent.  The cuff is well supported.  She does have a mild cystocele, asymptomatic.    Administrative Statements   I have spent a total time of 30 minutes in caring for this patient on the day of the visit/encounter including Risks and benefits of tx options, Instructions for management, Impressions, Counseling / Coordination of care, Documenting in the medical record, Reviewing / ordering tests, medicine, procedures  , and Obtaining or reviewing history  .

## 2024-09-25 ENCOUNTER — TELEPHONE (OUTPATIENT)
Age: 69
End: 2024-09-25

## 2024-09-25 NOTE — TELEPHONE ENCOUNTER
Pt pharmacy called in stating that script sent in yesterday is not covered under pt insurance and the formulary alternatives are either Estradiol 1mg or 2 mg tablets.    They are requesting a new script of either Estradiol 1mg or 2 mg tablets be sent over.

## 2024-09-26 DIAGNOSIS — Z78.0 MENOPAUSE: Primary | ICD-10-CM

## 2024-09-26 RX ORDER — ESTRADIOL 0.5 MG/1
0.5 TABLET ORAL DAILY
Qty: 30 TABLET | Refills: 2 | Status: SHIPPED | OUTPATIENT
Start: 2024-09-26

## 2024-09-26 NOTE — TELEPHONE ENCOUNTER
Left detailed message to inform patient of Dr. Mascorro's comments. Advised call back to report in 4 weeks.

## 2024-09-30 ENCOUNTER — TELEPHONE (OUTPATIENT)
Age: 69
End: 2024-09-30

## 2024-09-30 NOTE — TELEPHONE ENCOUNTER
Call back to pt and left detailed voicemail with recommendation from Dr. Mascorro, as per request documented in last note.

## 2024-09-30 NOTE — TELEPHONE ENCOUNTER
Spoke with Sofia. She states that her mood is currently stable and denies feeling depressed. She was informed that if she felt that Lamictal was not working as well once she started Estrace, Lamictal dose could be increased at that point.

## 2024-09-30 NOTE — TELEPHONE ENCOUNTER
Pt had Gynocology appt and was prescribed a medication for the problem. The Pharmacist informed her that this medication could interfere with one of the meds that is prescribed by you.  The new medication is estradiol (Estrace) 0.5 MG tablet for vaginal dryness.    Sofia stated that the Gynocologist said it could affect the efficacy of the lamoTRIgine (LaMICtal) 200 MG tablet.    She said she left the medication at [facility] and did not take it until she hears back from you.    She is just offering could she take her new medication in the morning and the Lamotrigine in the evening? Would this be a solution? What would you recommend to her?  Please leave directions on phone voicemail if she is unavailable.

## 2024-09-30 NOTE — TELEPHONE ENCOUNTER
Patient went to the pharmacy to  her medication prescribed by Dr. Mascorro and the pharmacist tole her that the  estradiol (Estrace) 0.5 MG tablet   Will be conflicting with the patient's other med  lamoTRIgine (LaMICtal) 200 MG tablet .  Please follow up with the patient. Patient did mention that if she does not  it is ok to leave a detailed message.

## 2024-10-01 ENCOUNTER — HOSPITAL ENCOUNTER (OUTPATIENT)
Dept: RADIOLOGY | Age: 69
Discharge: HOME | End: 2024-10-01
Payer: MEDICARE

## 2024-10-01 VITALS — BODY MASS INDEX: 26.06 KG/M2 | WEIGHT: 138 LBS | HEIGHT: 61 IN

## 2024-10-01 DIAGNOSIS — Z12.31 VISIT FOR SCREENING MAMMOGRAM: ICD-10-CM

## 2024-10-01 PROCEDURE — 77063 BREAST TOMOSYNTHESIS BI: CPT

## 2024-10-01 PROCEDURE — 77067 SCR MAMMO BI INCL CAD: CPT

## 2024-10-18 DIAGNOSIS — Z78.0 MENOPAUSE: ICD-10-CM

## 2024-10-18 RX ORDER — ESTRADIOL 0.5 MG/1
0.5 TABLET ORAL DAILY
Qty: 90 TABLET | Refills: 1 | Status: SHIPPED | OUTPATIENT
Start: 2024-10-18
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
## 2024-11-26 DIAGNOSIS — F90.2 ATTENTION DEFICIT HYPERACTIVITY DISORDER (ADHD), COMBINED TYPE: Primary | Chronic | ICD-10-CM

## 2024-11-26 RX ORDER — METHYLPHENIDATE HYDROCHLORIDE 10 MG/1
10 TABLET ORAL 2 TIMES DAILY
Qty: 60 TABLET | Refills: 0 | Status: SHIPPED | OUTPATIENT
Start: 2024-11-26 | End: 2024-12-02 | Stop reason: SDUPTHER

## 2024-11-26 NOTE — TELEPHONE ENCOUNTER
10/22/2024 08/01/2024 Methylphenidate Hcl (Tablet) 60.0 30 10 MG NA [facility] [place] 0 / 0 [place]   1 [id number] 09/17/2024 08/01/2024 Methylphenidate Hcl (Tablet) 60.0 30 10 MG NA [facility]. [place] 0 [place]   1 [id number] 08/14/2024 08/01/2024 Methylphenidate Hcl (Tablet) 60.0 30 10 MG NA [facility] [place]   1 [id number] 07/25/2024 07/25/2024 Codeine Phosphate 30 Mg Oral Tablet/acetaminophen 300 Mg (Tablet) 25.0 5 300 MG-30 MG 22.50 [facility] [place]   1 [id number] 07/09/2024 07/09/2024 Methylphenidate Hcl (Tablet) 60.0 30 10 MG NA JIN SILVA [facility]... [place] 0 [place]   1 [id number] 07/05/2024 07/05/2024 CODEINE PHOSPHATE/guaiFENesin (Solution) 120.0 12 10 MG/5 ML-100 MG/5 ML 3.0 [facility] [place] 0 / 0 [place]   1 [id number] 05/29/2024 02/28/2024 Methylphenidate Hcl (Tablet) 60.0 30 10 MG [facility]. [place] 0 / 0 [place]   1 [id number] 03/30/2024 02/28/2024 Methylphenidate Hcl (Tablet) 60.0 30 10 MG NA [facility] [place]   1 [id number] 03/02/2024 02/28/2024 Methylphenidate Hcl (Tablet) 60.0 30 10 MG NA [facility]

## 2024-11-26 NOTE — TELEPHONE ENCOUNTER
Ritalin was escripted for 30 days, no RF. Due now as per PDMP    Diagnoses and all orders for this visit:    Attention deficit hyperactivity disorder (ADHD), combined type  -     methylphenidate (Ritalin) 10 mg tablet; Take 1 tablet (10 mg total) by mouth 2 (two) times a day Max Daily Amount: 20 mg

## 2024-11-26 NOTE — TELEPHONE ENCOUNTER
Reason for call:   [x] Refill   [] Prior Auth  [] Other:     Office:   [] PCP/Provider -   [x] Specialty/Provider -     Medication: methylphenidate (Ritalin) 10 mg    Dose/Frequency: Take 1 tablet (10 mg total) by mouth 2 (two) times a day Max Daily Amount: 20 mg Do not start before October 7, 2024     Quantity: 60    Pharmacy: [facility]/pharmacy #[id number] - [place], [place] - [address]     Does the patient have enough for 3 days?   [x] Yes   [] No - Send as HP to POD

## 2024-12-02 ENCOUNTER — OFFICE VISIT (OUTPATIENT)
Dept: PSYCHIATRY | Facility: CLINIC | Age: 69
End: 2024-12-02
Payer: MEDICARE

## 2024-12-02 VITALS
BODY MASS INDEX: 26.24 KG/M2 | HEIGHT: 61 IN | WEIGHT: 139 LBS | SYSTOLIC BLOOD PRESSURE: 127 MMHG | HEART RATE: 92 BPM | DIASTOLIC BLOOD PRESSURE: 73 MMHG

## 2024-12-02 DIAGNOSIS — F41.1 GAD (GENERALIZED ANXIETY DISORDER): Chronic | ICD-10-CM

## 2024-12-02 DIAGNOSIS — G47.09 OTHER INSOMNIA: Chronic | ICD-10-CM

## 2024-12-02 DIAGNOSIS — F31.76 BIPOLAR I DISORDER, MOST RECENT EPISODE DEPRESSED, IN REMISSION (HCC): Primary | Chronic | ICD-10-CM

## 2024-12-02 DIAGNOSIS — F90.2 ATTENTION DEFICIT HYPERACTIVITY DISORDER (ADHD), COMBINED TYPE: Chronic | ICD-10-CM

## 2024-12-02 DIAGNOSIS — R41.3 MEMORY CHANGES: Chronic | ICD-10-CM

## 2024-12-02 PROBLEM — Z86.16 HISTORY OF COVID-19: Status: ACTIVE | Noted: 2024-07-05

## 2024-12-02 PROCEDURE — 99214 OFFICE O/P EST MOD 30 MIN: CPT | Performed by: PSYCHIATRY & NEUROLOGY

## 2024-12-02 PROCEDURE — G2211 COMPLEX E/M VISIT ADD ON: HCPCS | Performed by: PSYCHIATRY & NEUROLOGY

## 2024-12-02 PROCEDURE — 90833 PSYTX W PT W E/M 30 MIN: CPT | Performed by: PSYCHIATRY & NEUROLOGY

## 2024-12-02 RX ORDER — METHYLPHENIDATE HYDROCHLORIDE 10 MG/1
10 TABLET ORAL 2 TIMES DAILY
Qty: 60 TABLET | Refills: 0 | Status: SHIPPED | OUTPATIENT
Start: 2024-12-26 | End: 2025-01-25

## 2024-12-02 RX ORDER — METHYLPHENIDATE HYDROCHLORIDE 10 MG/1
10 TABLET ORAL 2 TIMES DAILY
Qty: 60 TABLET | Refills: 0 | Status: SHIPPED | OUTPATIENT
Start: 2025-02-24 | End: 2025-03-26

## 2024-12-02 RX ORDER — METHYLPHENIDATE HYDROCHLORIDE 10 MG/1
10 TABLET ORAL 2 TIMES DAILY
Qty: 60 TABLET | Refills: 0 | Status: SHIPPED | OUTPATIENT
Start: 2025-01-25 | End: 2025-02-24

## 2024-12-02 NOTE — ASSESSMENT & PLAN NOTE
Still some difficulty with attention and concentration  Continue Ritalin 10 mg twice a day to help with attention and concentration  Orders:    methylphenidate (Ritalin) 10 mg tablet; Take 1 tablet (10 mg total) by mouth 2 (two) times a day Max Daily Amount: 20 mg Do not start before February 24, 2025.    methylphenidate (Ritalin) 10 mg tablet; Take 1 tablet (10 mg total) by mouth 2 (two) times a day Max Daily Amount: 20 mg Do not start before January 25, 2025.    methylphenidate (Ritalin) 10 mg tablet; Take 1 tablet (10 mg total) by mouth 2 (two) times a day Max Daily Amount: 20 mg Do not start before December 26, 2024.

## 2024-12-02 NOTE — BH CRISIS PLAN
Client Name: Sofia Lopez       Client YOB: 1955    [employer] Safety Plan      Creation Date: 2/28/24 Update Date: 12/2/24   Created By: Mariella Green MD Last Updated By: Mariella Green MD      Step 1: Warning Signs:   Warning Signs   anxiety, fear. need to get help, therapy            Step 2: Internal Coping Strategies:   Internal Coping Strategies   puzzles, word games, movies            Step 3: People and social settings that provide distraction:   Name Contact Information   Friend Maria L Apple in my cell phone            Step 4: People whom I can ask for help during a crisis:      Name Contact Information    Dr. Green in my cell phone    Dr. Machado (PCP) in my cell phone      Step 5: Professionals or agencies I can contact during a crisis:      Clinican/Agency Name Phone Emergency Contact    [facility] [phone]       [facility] Emergency Department Emergency Department Phone Emergency Department Address    [facility] 911         Crisis Phone Numbers:   Suicide Prevention Lifeline: Call or Text  504 Crisis Text Line: Text HOME to 088-624   Please note: Some [facility] do not have a separate number for Child/Adolescent specific crisis. If your county is not listed under Child/Adolescent, please call the adult number for your county      Adult Crisis Numbers: Child/Adolescent Crisis Numbers   [facility]: [phone] [facility]: [phone]   [facility]: [phone] [facility]: [phone]   [facility]: [phone] [place]: [phone]   [facility]: [phone] [place]: [phone]   [place]: [phone]   [facility]: [phone]   [facility]: [phone]   [facility]: [phone] (daytime) [phone] (after hours, weekends, holidays)      Step 6: Making the environment safer (plan for lethal means safety):   Patient did not identify  any lethal methods: Yes     Optional: What is most important to me and worth living for?   My family     [place] Safety Plan. Jes Nesbitt and Fidel Andino. Used with permission of the authors.

## 2024-12-02 NOTE — ASSESSMENT & PLAN NOTE
Referral for neurological assessment due to c/o memory difficulties  Orders:    Ambulatory Referral to Neurology; Future

## 2024-12-02 NOTE — BH TREATMENT PLAN
"TREATMENT PLAN (Medication Management Only)        [facility]    Name/Date of Birth/MRN:  Sofia Lopez 69 y.o. 1955 MRN: [id number]  Date of Treatment Plan: December 2, 2024  Diagnosis/Diagnoses:   1. Bipolar I disorder, most recent episode depressed, in remission (HCC)    2. RAMEZ (generalized anxiety disorder)    3. Attention deficit hyperactivity disorder (ADHD), combined type    4. Other insomnia    5. Memory changes      Strengths/Personal Resources for Self-Care: \"I want to know how I am doing and I want feedback\".  Area/Areas of need (in own words): \"I still feel very distracted\".  1. Long Term Goal:   improve control of anxiety, maintain mood stability, control ADHD symptoms, improve memory  Target Date: 4 months - 4/2/2025  Person/Persons responsible for completion of goal: Sofia  2.  Short Term Objective (s) - How will we reach this goal?:   A.  Provider new recommended medication/dosage changes and/or continue medication(s): continue current medications as prescribed (Trazodone, Lamictal, and Ritalin).  B.  N/A.  C.  N/A.  Target Date: 4 months - 4/2/2025  Person/Persons Responsible for Completion of Goal: Sofia   Progress Towards Goals: progressing  Treatment Modality: medication management every 4 months, referral for neurological assessment  Review due 180 days from date of this plan: 6 months - 6/2/2025  Expected length of service: ongoing treatment unless revised  My Physician/PA/NP and I have developed this plan together and I agree to work on the goals and objectives. I understand the treatment goals that were developed for my treatment.  Electronic Signatures: on file (unless signed below)    Mariella Green MD 12/02/24  "

## 2024-12-06 ENCOUNTER — TELEPHONE (OUTPATIENT)
Age: 69
End: 2024-12-06

## 2024-12-06 NOTE — TELEPHONE ENCOUNTER
Inbound call received from patient.     Patient stated that she has some questions with regards to upcoming appt with [facility]. Pt stated she would like to speak with the same person that helped her schedule her appts. Stated that she likes to speak with the same people because of her memory.     RN contacted [facility] and spoke with Yulissa. Yulissa stated she will send Lesly a message to contact patient. RN informed pt that office will reach out to patient.

## 2024-12-27 ENCOUNTER — TELEPHONE (OUTPATIENT)
Age: 69
End: 2024-12-27

## 2024-12-27 NOTE — TELEPHONE ENCOUNTER
"Incoming call from patient. States that she has external vaginal rash and itching that has been ongoing \"for a while\". Patient states that she is also having vaginal prolapse that has also been ongoing \"for a while\". Denies additional symptoms or changes at this time.     Patient was scheduled by PEP for first available appointment on 2/3/25.     Routing to provider for review if sooner appointment is available.   "

## 2024-12-27 NOTE — TELEPHONE ENCOUNTER
Pt called in with possible side effect from medication and possible vaginal prolapse.     Has concerns about potential surgery for prolapse.    Does provider want pt in office sooner?

## 2024-12-29 ENCOUNTER — TELEPHONE (OUTPATIENT)
Dept: OTHER | Facility: OTHER | Age: 69
End: 2024-12-29

## 2024-12-29 NOTE — TELEPHONE ENCOUNTER
Pt called because she desperately needs an appt as soon as possible. Pt has a rash on labia and doesn't want to wait until her next appt. Please call once office reopens.

## 2024-12-30 NOTE — TELEPHONE ENCOUNTER
Patient calling in requesting a sooner appointment due to vaginal irritation and pt can't wait until February.     Appt scheduled for tomorrow with Dr meeks.

## 2024-12-30 NOTE — TELEPHONE ENCOUNTER
Called pt and lvm regarding openings for today, and if still experiencing symptoms to call back and schedule.

## 2024-12-31 ENCOUNTER — OFFICE VISIT (OUTPATIENT)
Dept: OBGYN CLINIC | Facility: CLINIC | Age: 69
End: 2024-12-31
Payer: MEDICARE

## 2024-12-31 DIAGNOSIS — E78.5 HYPERLIPIDEMIA ASSOCIATED WITH TYPE 2 DIABETES MELLITUS  (HCC): ICD-10-CM

## 2024-12-31 DIAGNOSIS — B37.31 CANDIDIASIS OF VULVA AND VAGINA: ICD-10-CM

## 2024-12-31 DIAGNOSIS — N76.2 ACUTE VULVITIS: Primary | ICD-10-CM

## 2024-12-31 DIAGNOSIS — E11.65 UNCONTROLLED TYPE 2 DIABETES MELLITUS WITH HYPERGLYCEMIA (HCC): ICD-10-CM

## 2024-12-31 DIAGNOSIS — E11.69 HYPERLIPIDEMIA ASSOCIATED WITH TYPE 2 DIABETES MELLITUS  (HCC): ICD-10-CM

## 2024-12-31 PROCEDURE — 99213 OFFICE O/P EST LOW 20 MIN: CPT | Performed by: OBSTETRICS & GYNECOLOGY

## 2024-12-31 RX ORDER — FLUCONAZOLE 150 MG/1
150 TABLET ORAL
Qty: 2 TABLET | Refills: 0 | Status: SHIPPED | OUTPATIENT
Start: 2024-12-31 | End: 2025-01-04

## 2024-12-31 RX ORDER — CLOTRIMAZOLE AND BETAMETHASONE DIPROPIONATE 10; .64 MG/G; MG/G
CREAM TOPICAL 2 TIMES DAILY
Qty: 45 G | Refills: 0 | Status: SHIPPED | OUTPATIENT
Start: 2024-12-31 | End: 2025-01-10

## 2024-12-31 NOTE — PROGRESS NOTES
Name: Sofia Lopez      : 1955      MRN: [id number]  Encounter Provider: Bertha Mascorro DO  Encounter Date: 2024   Encounter department: [facility]  :  Assessment & Plan  Candidiasis of vulva and vagina    Orders:    fluconazole (DIFLUCAN) 150 mg tablet; Take 1 tablet (150 mg total) by mouth every 3 (three) days for 2 doses    Acute vulvitis    Orders:    clotrimazole-betamethasone (LOTRISONE) 1-0.05 % cream; Apply topically 2 (two) times a day for 10 days    Uncontrolled type 2 diabetes mellitus with hyperglycemia (HCC)    Lab Results   Component Value Date    HGBA1C 7.9 (H) 2024            Hyperlipidemia associated with type 2 diabetes mellitus  (HCC)    Lab Results   Component Value Date    HGBA1C 7.9 (H) 2024            Patient will continue estradiol 0.5 mg daily.  She will also start [id number] mg p.o. every 3 days x 2 doses, Lotrisone apply topically nightly x 7 to 10 days.  She will call with update in 1 week.  Otherwise, return to office 2025 for annual visit or as needed    History of Present Illness   HPI  Sfoia Lopez is a 69 y.o. female who presents     This is a 69-year-old female P3 ( x 3 presents complaining of external vaginal irritation over the last 5 days.  She denies any vaginal bleeding or spotting.  She never started oshena and opted for estradiol 0.5 mg daily.  She is uncertain if this is helped with vaginal dryness.  She has not been on any antibiotic recently.  She has not been sexually active.  Her recent hemoglobin A1c 7.9.  Her diabetes medication is being altered.    Estradiol  mg daily    Osphena       History obtained from: patient    Review of Systems   Constitutional:  Negative for fatigue, fever and unexpected weight change.   Respiratory:  Negative for cough, chest tightness, shortness of breath and wheezing.    Cardiovascular: Negative.  Negative for chest pain and palpitations.   Gastrointestinal: Negative.  Negative for  abdominal distention, abdominal pain, blood in stool, constipation, diarrhea, nausea and vomiting.   Genitourinary:  Positive for vaginal pain. Negative for difficulty urinating, dyspareunia, dysuria, flank pain, frequency, genital sores, hematuria, pelvic pain, urgency, vaginal bleeding and vaginal discharge.   Skin:  Negative for rash.     Current Outpatient Medications on File Prior to Visit   Medication Sig Dispense Refill    atorvastatin (LIPITOR) 20 mg tablet Take 20 mg by mouth every morning      BD Pen Needle Perlita U/F 32G X 4 MM MISC       BIOTIN PO Hair, Skin and Nails (biotin)      cholecalciferol (VITAMIN D3) 400 units tablet Take 400 Units by mouth daily      COLLAGEN PO Take by mouth      estradiol (ESTRACE) 0.5 MG tablet TAKE 1 TABLET BY MOUTH EVERY DAY 90 tablet 1    ibuprofen (MOTRIN) 800 mg tablet Take 800 mg by mouth      lamoTRIgine (LaMICtal) 200 MG tablet Take 1 tablet (200 mg total) by mouth daily 90 tablet 2    Lantus SoloStar 100 units/mL injection pen Inject 18 Units under the skin daily before lunch      levothyroxine 88 mcg tablet Take 88 mcg by mouth every morning      metFORMIN (GLUCOPHAGE) 1000 MG tablet TAKE 1 TABLET BY MOUTH 2 TIMES A DAY WITH MEALS.      [START ON 2/24/2025] methylphenidate (Ritalin) 10 mg tablet Take 1 tablet (10 mg total) by mouth 2 (two) times a day Max Daily Amount: 20 mg Do not start before February 24, 2025. 60 tablet 0    Multiple Vitamin (MULTIVITAMIN ADULT PO) multivitamin      omeprazole (PriLOSEC) 20 mg delayed release capsule Take 20 mg by mouth daily      SITagliptin Phosphate (JANUVIA PO) Take by mouth daily after breakfast      traZODone (DESYREL) 50 mg tablet Take 1 tablet (50 mg total) by mouth daily at bedtime as needed for sleep 90 tablet 2    Accu-Chek FastClix Lancets MISC 4 (four) times a day Test      Accu-Chek SmartView test strip 4 (four) times a day Test      Alcohol Swabs (Alcohol Prep) 70 % PADS       [START ON 1/25/2025] methylphenidate  (Ritalin) 10 mg tablet Take 1 tablet (10 mg total) by mouth 2 (two) times a day Max Daily Amount: 20 mg Do not start before January 25, 2025. 60 tablet 0    methylphenidate (Ritalin) 10 mg tablet Take 1 tablet (10 mg total) by mouth 2 (two) times a day Max Daily Amount: 20 mg Do not start before December 26, 2024. 60 tablet 0    Trulicity 1.5 MG/0.5ML SOPN once a week (Patient not taking: Reported on 8/1/2024)       No current facility-administered medications on file prior to visit.         Objective   There were no vitals taken for this visit.     Physical Exam  Abdominal:      General: Bowel sounds are normal. There is no distension.      Palpations: Abdomen is soft.      Tenderness: There is no abdominal tenderness. There is no guarding.   Genitourinary:     Labia:         Right: Rash present. No tenderness or lesion.         Left: Rash present. No tenderness or lesion.       Vagina: No signs of injury. No vaginal discharge, tenderness or lesions.      Uterus: Absent.      External genitalia is evident of erythema bilaterally along labia consistent with candidiasis.  The vagina is well estrogenized.  The cervix is surgically absent.  Scant discharge noted.    Administrative Statements   I have spent a total time of 20 minutes in caring for this patient on the day of the visit/encounter including Prognosis, Risks and benefits of tx options, Instructions for management, Impressions, Counseling / Coordination of care, Documenting in the medical record, Reviewing / ordering tests, medicine, procedures  , and Obtaining or reviewing history  .

## 2025-01-09 DIAGNOSIS — G47.09 OTHER INSOMNIA: Chronic | ICD-10-CM

## 2025-01-09 DIAGNOSIS — F31.76 BIPOLAR I DISORDER, MOST RECENT EPISODE DEPRESSED, IN REMISSION (HCC): Chronic | ICD-10-CM

## 2025-01-09 NOTE — TELEPHONE ENCOUNTER
Reason for call:   [x] Refill   [] Prior Auth  [] Other:     Office:   [] PCP/Provider -   [x] Specialty/Provider -     Medication: lamoTRIgine (LaMICtal) 200 MG tablet     Dose/Frequency: Take 1 tablet (200 mg total) by mouth daily,     Quantity:  90 tablet     Medication: traZODone (DESYREL) 50 mg tablet     Dose/Frequency: Take 1 tablet (50 mg total) by mouth daily at bedtime as needed for sleep     Quantity:  90 tablet     Pharmacy: [facility] - [address]      Does the patient have enough for 3 days?   [x] Yes   [] No - Send as HP to POD

## 2025-01-10 RX ORDER — TRAZODONE HYDROCHLORIDE 50 MG/1
50 TABLET, FILM COATED ORAL
Qty: 90 TABLET | Refills: 2 | Status: SHIPPED | OUTPATIENT
Start: 2025-01-10 | End: 2025-10-07

## 2025-01-10 RX ORDER — LAMOTRIGINE 200 MG/1
200 TABLET ORAL DAILY
Qty: 90 TABLET | Refills: 2 | Status: SHIPPED | OUTPATIENT
Start: 2025-01-10 | End: 2025-10-07

## 2025-01-22 ENCOUNTER — OFFICE VISIT (OUTPATIENT)
Age: 70
End: 2025-01-22
Payer: MEDICARE

## 2025-01-22 VITALS
HEART RATE: 78 BPM | DIASTOLIC BLOOD PRESSURE: 80 MMHG | OXYGEN SATURATION: 97 % | SYSTOLIC BLOOD PRESSURE: 142 MMHG | WEIGHT: 138.4 LBS | BODY MASS INDEX: 26.15 KG/M2

## 2025-01-22 DIAGNOSIS — H91.8X3 OTHER SPECIFIED HEARING LOSS OF BOTH EARS: ICD-10-CM

## 2025-01-22 DIAGNOSIS — R41.3 MEMORY CHANGES: Primary | Chronic | ICD-10-CM

## 2025-01-22 DIAGNOSIS — E53.8 VITAMIN B12 DEFICIENCY: ICD-10-CM

## 2025-01-22 DIAGNOSIS — M81.0 POSTMENOPAUSAL BONE LOSS: ICD-10-CM

## 2025-01-22 PROCEDURE — 99205 OFFICE O/P NEW HI 60 MIN: CPT | Performed by: FAMILY MEDICINE

## 2025-01-22 NOTE — ASSESSMENT & PLAN NOTE
MOCA 28/30. GDS 2/15. TUG test normal. No memory problems. Pt has hx of ADHD and BPD. Functional w her ADLs and iADLs.     Ordered Neurotrax Testing   Ordered MRI  Neuroquant   Ordered Folate, Vit D   Provide redirection, reorientation, distraction techniques  Fall Precautions  Assist with ADLs/IADLs  Avoid deliriogenic medications such as tramadol, benzodiazepines, anticholinergics, benadryl  Encourage Hydration/ Nutrition  Implement sleep hygiene, limit night time interuptions   Encourage participation in group activities when appropriate

## 2025-01-22 NOTE — PROGRESS NOTES
ASSESSMENT AND PLAN:    Memory change    MOCA 28/30. GDS 2/15. TUG test normal. No memory problems. Pt has hx of ADHD and BPD. Functional w her ADLs and iADLs.     Ordered Neurotrax Testing   Ordered MRI  Neuroquant   Ordered Folate, Vit D   Provide redirection, reorientation, distraction techniques  Fall Precautions  Assist with ADLs/IADLs  Avoid deliriogenic medications such as tramadol, benzodiazepines, anticholinergics, benadryl  Encourage Hydration/ Nutrition  Implement sleep hygiene, limit night time interuptions   Encourage participation in group activities when appropriate   Orders:  -     Neurotrax; Future  -     MRI brain NeuroQuant wo contrast; Future; Expected date: 01/22/2025  -     Folate; Future  -     Vitamin D 25 hydroxy; Future    Vitamin B12 deficiency    Vit B12 levels were low at 83.     Advised to take Vit B12 1000 mcg, PO, QD     -     cyanocobalamin (VITAMIN B-12) 1000 MCG tablet; Take 1 tablet (1,000 mcg total) by mouth daily    Other specified hearing loss of both ears    Pt refers hx of hearing loss and would like to be assessed.     Sent referral for Audiology  Orders:  -     Ambulatory Referral to Audiology; Future      Decision-making capacity: Intact    Medications Review: Yes    HPI:    We had the pleasure of evaluating Sofia Lopez who is a 70 y.o. female in Geriatric consultation today.  Ms. Lopez is in the office with her friend.  She lives by herself.     Her friend states that she normally keep her house and herself are very well kept. She continues to drive w no problems. She is a world traveler. Able to shop and manage her finances, has a . Pt doing well w her ADLS and I ADLS. Has problems with organizational skills. Has family hx of dementia: Mother, siblings and 2 aunts have Alzheimer. Hx of hitting her head two years after falling down the stairs. No troubles with ambulation, no assistive devices. Eats well. Lives alone in a [facility] community  and is social with her neighbors. Has hx of hearing loss but does not use any hearing aides. Sometimes seems like she gets very frazelled, very confused. Pt seems to be normally scattered. Has hx of ADHD. Has a son in [facility] and a daughter in another state. Her friends from where she lives were concerned that she has been having some memory problems and gave her that feedback which prompt her to seek help and medical advice.     HISTORY AS PER: Patient and family     COGNITION:    Memory Issues noticed since 1 year(s)    Memory affected: intact    Symptoms started: gradual  Over time the memory has:  stable  Memory issue(s) were noted by: patient and friends  Difficulty finding the right word while speaking: Yes  Requires repeat information or asking the same question repeatedly: No  Fluctuation in alertness: No  Changes in mood or personality:No  Current or previous treatment for depression or anxiety: Yes    Family member with dementia and what type? Yes - Alzheimers  History of head trauma: Yes  History of stroke: No  History of alcohol or substance abuse: No    FUNCTIONAL STATUS:    BADLs  Does patient require assistance with:  Bathing: No  Dressing: No  Transferring: No  Continence: No  Toileting: No  Feeding: No    IADLs  Dose patient require assistance with:  Telephone: No  Shopping: No  Food Preparation: No  Housekeeping: No  Laundry: No  Transportation: No  Medications: No  Finances: Yes    NEUROPSYCH SYMPTOMS:  Does patient get angry or hostile?  Resist care from others? No  Does patient see or hear things that no one else can see or hear? No  Does patient act impatient and cranky? Does mood frequently change for no apparent reason? No  Does patient act suspicious without good reason (example: believes that others are stealing from him or her, or planning to harm him or her in some way)? No  Does patient less interested in his or her usual activities or in the activities and plans of others? No  Does  patient have trouble sleeping at night? No  Dose patient have abnormal movements while asleep? No    SAFETY:  Hearing and vision issue: Yes  Any gait or balance disorder: No  Uses: Nothing   Any falls in the last year: No  Any history of wandering: No  Are there firearms or guns in the home: No  Does patient drive: Yes  Any driving accidents or citations in the last year: No  Any concerns about patient's ability to drive: No    ACP REVIEW:  Does patient have POA: No  Does patient have a Living will: No  Any legal assistance needed for healthcare planning?: No    Allergies   Allergen Reactions    Ace Inhibitors Cough    Bee Pollen Other (See Comments)    Dapagliflozin Abdominal Pain     Frequent UTI      Penicillin G Other (See Comments)     As a child    Pollen Extract Other (See Comments)    Tree Extract Other (See Comments)     Medications:      Current Outpatient Medications on File Prior to Visit   Medication Sig Dispense Refill    Accu-Chek FastClix Lancets MISC 4 (four) times a day Test      Accu-Chek SmartView test strip 4 (four) times a day Test      Alcohol Swabs (Alcohol Prep) 70 % PADS       atorvastatin (LIPITOR) 20 mg tablet Take 20 mg by mouth every morning      BD Pen Needle Perlita U/F 32G X 4 MM MISC       BIOTIN PO Hair, Skin and Nails (biotin)      cholecalciferol (VITAMIN D3) 400 units tablet Take 400 Units by mouth daily      COLLAGEN PO Take by mouth      estradiol (ESTRACE) 0.5 MG tablet TAKE 1 TABLET BY MOUTH EVERY DAY 90 tablet 1    ibuprofen (MOTRIN) 800 mg tablet Take 800 mg by mouth      lamoTRIgine (LaMICtal) 200 MG tablet Take 1 tablet (200 mg total) by mouth daily 90 tablet 2    Lantus SoloStar 100 units/mL injection pen Inject 18 Units under the skin daily before lunch      levothyroxine 88 mcg tablet Take 88 mcg by mouth every morning      metFORMIN (GLUCOPHAGE) 1000 MG tablet TAKE 1 TABLET BY MOUTH 2 TIMES A DAY WITH MEALS.      methylphenidate (Ritalin) 10 mg tablet Take 1 tablet (10  mg total) by mouth 2 (two) times a day Max Daily Amount: 20 mg Do not start before December 26, 2024. 60 tablet 0    Multiple Vitamin (MULTIVITAMIN ADULT PO) multivitamin      omeprazole (PriLOSEC) 20 mg delayed release capsule Take 20 mg by mouth daily      SITagliptin Phosphate (JANUVIA PO) Take by mouth daily after breakfast      traZODone (DESYREL) 50 mg tablet Take 1 tablet (50 mg total) by mouth daily at bedtime as needed for sleep 90 tablet 2    clotrimazole-betamethasone (LOTRISONE) 1-0.05 % cream Apply topically 2 (two) times a day for 10 days 45 g 0    [START ON 2/24/2025] methylphenidate (Ritalin) 10 mg tablet Take 1 tablet (10 mg total) by mouth 2 (two) times a day Max Daily Amount: 20 mg Do not start before February 24, 2025. (Patient not taking: Reported on 1/22/2025 Do not start before February 24, 2025.) 60 tablet 0    [START ON 1/25/2025] methylphenidate (Ritalin) 10 mg tablet Take 1 tablet (10 mg total) by mouth 2 (two) times a day Max Daily Amount: 20 mg Do not start before January 25, 2025. (Patient not taking: Reported on 1/22/2025 Do not start before January 25, 2025.) 60 tablet 0    Trulicity 1.5 MG/0.5ML SOPN once a week (Patient not taking: Reported on 1/22/2025)       No current facility-administered medications on file prior to visit.     History:    Past Medical History:   Diagnosis Date    Abnormal nerve conduction studies     Anxiety     Bilateral pseudophakia     Bipolar disorder (HCC)     Carpal tunnel syndrome     COVID     Diabetes mellitus (HCC) 2000    Disease of thyroid gland     GERD (gastroesophageal reflux disease)     Gestational diabetes 1978    Head injury     Hearing loss     Hemorrhoids     Hyperlipidemia     Hypothyroidism 2000    Kidney stone 1977    Only a 1 time ocurrence.    Lactose intolerance     Maxillary fracture (HCC)     Olecranon bursitis     Orbit fracture (HCC)     Osteopenia     Paresthesia of both hands     Posterior vitreous detachment     Radial styloid  tenosynovitis     Seasonal allergies     Seborrheic keratosis     Subarachnoid hemorrhage (HCC)     Thrombocytosis     Vaginal atrophy     Vitamin D deficiency      Past Surgical History:   Procedure Laterality Date    ABDOMINOPLASTY N/A 11/22/2022    Procedure: EXCISION OF ABDOMINAL SKIN;  Surgeon: Morgan Ryder MD;  Location:  MAIN OR;  Service: Plastics    AUGMENTATION MAMMAPLASTY Bilateral 1986    saline implants    BLEPHAROPLASTY Bilateral 11/22/2022    Procedure: BLEPHAROPLASTY LOWER;  Surgeon: Morgan Ryder MD;  Location:  MAIN OR;  Service: Plastics    BREAST IMPLANT Bilateral 11/22/2022    Procedure: BREAST IMPLANT EXCHANGE;  Surgeon: Morgan Ryder MD;  Location:  MAIN OR;  Service: Plastics    CARPAL TUNNEL RELEASE      DENTAL SURGERY      EYE SURGERY      FACIAL RHYTIDECTOMY N/A 11/22/2022    Procedure: FACELIFT;  Surgeon: Morgan Ryder MD;  Location:  MAIN OR;  Service: Plastics    HAND SURGERY      HYSTERECTOMY      @ age 48    UPPER GASTROINTESTINAL ENDOSCOPY       Family History   Problem Relation Age of Onset    Breast cancer Mother         Quadruple Bypass    Breast cancer Father         Stroke    Multiple myeloma Sister     Breast cancer Sister         Multiple Myeloma    Alcohol abuse Daughter     Breast cancer Maternal Grandmother         Breast Cancer    No Known Problems Maternal Grandfather     No Known Problems Paternal Grandmother     No Known Problems Paternal Grandfather     ADD / ADHD Son     Drug abuse Daughter     Mental illness Daughter     No Known Problems Sister     No Known Problems Brother     No Known Problems Brother     No Known Problems Brother     No Known Problems Maternal Aunt     No Known Problems Maternal Aunt     No Known Problems Paternal Aunt     Mental illness Other     Suicide Attempts Neg Hx      Social History     Socioeconomic History    Marital status:      Spouse name: Not on file    Number of children: 3    Years of education: bachelor's  degree    Highest education level: Bachelor's degree (e.g., BA, AB, BS)   Occupational History    Occupation: retired   Tobacco Use    Smoking status: Never    Smokeless tobacco: Never   Vaping Use    Vaping status: Never Used   Substance and Sexual Activity    Alcohol use: Yes     Comment: Social drinker, never alone.    Drug use: Never    Sexual activity: Yes     Partners: Male     Birth control/protection: Post-menopausal   Other Topics Concern    Not on file   Social History Narrative    Education: bachelor's degree in psychology and socioligy    Learning Disabilities: ADHD history    Marital History: . 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, retired    Functioning Relationships: son is supportive    Legal History: past arrest due to marijuana possession many years ago     History: None     Social Drivers of Health     Financial Resource Strain: Low Risk  (12/2/2024)    Overall Financial Resource Strain (CARDIA)     Difficulty of Paying Living Expenses: Not hard at all   Food Insecurity: No Food Insecurity (12/2/2024)    Hunger Vital Sign     Worried About Running Out of Food in the Last Year: Never true     Ran Out of Food in the Last Year: Never true   Transportation Needs: No Transportation Needs (12/2/2024)    PRAPARE - Transportation     Lack of Transportation (Medical): No     Lack of Transportation (Non-Medical): No   Physical Activity: Sufficiently Active (12/2/2024)    Exercise Vital Sign     Days of Exercise per Week: 4 days     Minutes of Exercise per Session: 60 min   Stress: Stress Concern Present (12/2/2024)    [employer] - Occupational Stress Questionnaire     Feeling of Stress : To some extent   Social Connections: Socially Isolated (12/2/2024)    Social Connection  and Isolation Panel [NHANES]     Frequency of Communication with Friends and Family: Never     Frequency of Social Gatherings with Friends and Family: Never     Attends Episcopal Services: Never     Active Member of Clubs or Organizations: No     Attends Club or Organization Meetings: Never     Marital Status:    Intimate Partner Violence: Not At Risk (12/2/2024)    Humiliation, Afraid, Rape, and Kick questionnaire     Fear of Current or Ex-Partner: No     Emotionally Abused: No     Physically Abused: No     Sexually Abused: No   Housing Stability: Low Risk  (12/2/2024)    Housing Stability Vital Sign     Unable to Pay for Housing in the Last Year: No     Number of Times Moved in the Last Year: 0     Homeless in the Last Year: No     Past Surgical History:   Procedure Laterality Date    ABDOMINOPLASTY N/A 11/22/2022    Procedure: EXCISION OF ABDOMINAL SKIN;  Surgeon: Morgan Ryder MD;  Location: [facility];  Service: Plastics    AUGMENTATION MAMMAPLASTY Bilateral 1986    saline implants    BLEPHAROPLASTY Bilateral 11/22/2022    Procedure: BLEPHAROPLASTY LOWER;  Surgeon: Morgan Ryder MD;  Location:  MAIN OR;  Service: Plastics    BREAST IMPLANT Bilateral 11/22/2022    Procedure: BREAST IMPLANT EXCHANGE;  Surgeon: Morgan Ryder MD;  Location:  MAIN OR;  Service: Plastics    CARPAL TUNNEL RELEASE      DENTAL SURGERY      EYE SURGERY      FACIAL RHYTIDECTOMY N/A 11/22/2022    Procedure: FACELIFT;  Surgeon: Morgan Ryder MD;  Location:  MAIN OR;  Service: Plastics    HAND SURGERY      HYSTERECTOMY      @ age 48    UPPER GASTROINTESTINAL ENDOSCOPY         OBJECTIVE:  Vitals:    01/22/25 1518   BP: 142/80   Pulse: 78   SpO2: 97%   Weight: 62.8 kg (138 lb 6.4 oz)     Body mass index is 26.15 kg/m².    Physical Exam  Vitals and nursing note reviewed.   Constitutional:       General: She is awake. She is not in acute distress.     Appearance: Normal appearance. She is well-developed, well-groomed and  overweight. She is not ill-appearing or toxic-appearing.   HENT:      Head: Normocephalic and atraumatic.      Right Ear: External ear normal.      Left Ear: External ear normal.      Nose: Nose normal.      Mouth/Throat:      Mouth: Mucous membranes are moist.   Eyes:      General: No scleral icterus.     Conjunctiva/sclera: Conjunctivae normal.   Cardiovascular:      Rate and Rhythm: Normal rate and regular rhythm.      Pulses: Normal pulses.      Heart sounds: Normal heart sounds.   Pulmonary:      Effort: Pulmonary effort is normal. No respiratory distress.      Breath sounds: Normal breath sounds.   Abdominal:      General: Abdomen is flat. There is no distension.      Palpations: Abdomen is soft.      Tenderness: There is no abdominal tenderness.   Musculoskeletal:         General: Normal range of motion.      Cervical back: Normal range of motion.      Right lower leg: No edema.      Left lower leg: No edema.   Skin:     General: Skin is warm and dry.      Findings: No lesion or rash.   Neurological:      Mental Status: She is alert, oriented to person, place, and time and easily aroused.   Psychiatric:         Mood and Affect: Mood normal.         Behavior: Behavior normal. Behavior is cooperative.         Thought Content: Thought content normal.         Judgment: Judgment normal.       MoCA: 28/30    Geriatric Depression Screen      Flowsheet Row Most Recent Value   Total Score (max 30) 2          TUG test: Normal    Labs & Imaging:    Lab Results   Component Value Date    SODIUM 138 01/09/2025    K 4.3 01/09/2025     01/09/2025    CO2 29 01/09/2025    AGAP 8 01/09/2025    BUN 14 01/09/2025    CREATININE 0.73 01/09/2025    GLUC 130 (H) 01/09/2025    CALCIUM 9.2 01/09/2025    AST 15 11/26/2024    ALT 14 11/26/2024    ALKPHOS 58 11/26/2024    TP 6.5 11/26/2024    TBILI 0.6 11/26/2024    EGFR 88 01/09/2025     Lab Results   Component Value Date    HGBA1C 7.9 (H) 12/05/2024     Lab Results   Component  Value Date    EDCZGDWL65 83 (L) 01/09/2025     Lab Results   Component Value Date    TSH 1.01 01/09/2025     I have spent 60 minutes with Patient  today including taking history from family, patient, review of records, charting, and counseling regarding diagnosis and management of conditions.    It was a pleasure to be of service to Sofia Lopez.    Kelley Nguyễn MD, MSMS - PGY4  [facility] FM / Geriatric Fellow

## 2025-01-28 ENCOUNTER — TELEPHONE (OUTPATIENT)
Age: 70
End: 2025-01-28

## 2025-01-28 NOTE — TELEPHONE ENCOUNTER
Patient is calling regarding cancelling an appointment.    Date/Time: 3/3/25/ 2pm    Reason: schedule conflict    Patient was rescheduled: YES [x] NO []  If yes, when was Patient reschedule for: 3/25/25    Patient requesting call back to reschedule: YES [] NO [x]

## 2025-01-29 ENCOUNTER — TELEPHONE (OUTPATIENT)
Age: 70
End: 2025-01-29

## 2025-01-29 NOTE — TELEPHONE ENCOUNTER
Patient MRI is scheduled for 2/19 the day of her Care Conference.  She does not want to reschedule her care conference and is looking to see if she can have her MRI sooner at [facility].  Patient would like to know if there is something more intense that you are looking for other than memory changes in her MRI?    Please advise and notify.    Thank you.

## 2025-01-30 ENCOUNTER — OFFICE VISIT (OUTPATIENT)
Age: 70
End: 2025-01-30
Payer: MEDICARE

## 2025-01-30 ENCOUNTER — OFFICE VISIT (OUTPATIENT)
Dept: AUDIOLOGY | Age: 70
End: 2025-01-30
Payer: MEDICARE

## 2025-01-30 DIAGNOSIS — H91.8X3 OTHER SPECIFIED HEARING LOSS OF BOTH EARS: ICD-10-CM

## 2025-01-30 DIAGNOSIS — R41.3 MEMORY LOSS: Primary | ICD-10-CM

## 2025-01-30 PROCEDURE — PBNCHG PB NO CHARGE PLACEHOLDER: Performed by: FAMILY MEDICINE

## 2025-01-30 PROCEDURE — 96139 PSYCL/NRPSYC TST TECH EA: CPT | Performed by: FAMILY MEDICINE

## 2025-01-30 PROCEDURE — 92567 TYMPANOMETRY: CPT | Performed by: AUDIOLOGIST

## 2025-01-30 PROCEDURE — 92557 COMPREHENSIVE HEARING TEST: CPT | Performed by: AUDIOLOGIST

## 2025-01-30 PROCEDURE — 96138 PSYCL/NRPSYC TECH 1ST: CPT | Performed by: FAMILY MEDICINE

## 2025-01-30 NOTE — PROGRESS NOTES
[address]  [facility] [address]  [phone]    Sofia Lopez was here today for Neurotrax comprehensive test. It took the patient 66 minutes to complete.

## 2025-01-30 NOTE — PROGRESS NOTES
Diagnostic Hearing Evaluation    Name:  Sofia Lopez  :  1955  Age:  70 y.o.   MRN:  [id number]  Date of Evaluation: 25     HISTORY:     Reason for visit: Difficulty Understanding    Sofia Lopez is being seen today at the request of Dr. Nguyễn for an initial  evaluation of hearing. The patient reports some trouble hearing/understanding in noise. The patient  denies otalgia, otorrhea, dizziness, fullness, tinnitus, noise exposure, and family history of hearing loss.     EVALUATION:    Otoscopic Evaluation:   Right Ear: Unremarkable, canal clear   Left Ear: Unremarkable, canal clear    Tympanometry:   Right Ear: Type A; normal middle ear pressure and static compliance    Left Ear: Type A; normal middle ear pressure and static compliance     Speech Audiometry:  Speech Reception (SRT)    Right Ear: 15 dB HL    Left Ear: 15 dB HL    Word Recognition Scores (WRS):  Right Ear: excellent (100 % correct)     Left Ear: excellent (100 % correct)    Stimuli: [id number]    Pure Tone Audiometry:  Conventional pure tone audiometry from 250 - 8000 Hz  was obtained with good reliability and revealed the following:     Right Ear: Normal sloping to moderate sensorineural hearing loss (SNHL)    Left Ear: Normal sloping to moderate sensorineural hearing loss (SNHL)     *see attached audiogram    IMPRESSIONS:   Normal to moderate hearing loss bilaterally.    RECOMMENDATIONS:  Annual hearing eval, Return to [facility]. for F/U, and Copy to Patient/Caregiver    PATIENT EDUCATION:   The results of today's results and recommendations were reviewed with the patient and her hearing thresholds were explained at length. Treatment options, including amplification and communication strategies, were discussed as appropriate. The patient voiced understanding of her test results. Questions were addressed and the patient was encouraged to contact our department should concerns arise.      Jodie Yi, CCC-A  Clinical  Audiologist  [facility]  [address] [id number]

## 2025-02-04 ENCOUNTER — HOSPITAL ENCOUNTER (OUTPATIENT)
Dept: RADIOLOGY | Facility: HOSPITAL | Age: 70
Discharge: HOME | End: 2025-02-04
Payer: MEDICARE

## 2025-02-04 DIAGNOSIS — R41.3 MEMORY CHANGES: ICD-10-CM

## 2025-02-04 PROCEDURE — 70551 MRI BRAIN STEM W/O DYE: CPT

## 2025-02-10 NOTE — PROGRESS NOTES
[facility]  [address], [address]  [place] [address]  [phone]    Care Conference    NAME: Sofia Lopez    AGE: 70 y.o.   SEX: Female  YOB: 1955  DATE OF ASSESSMENT: 01/22/25  DATE OF CONFERENCE: 02/19/25    Family Present: Brother Anatoly in person and Sister Janet/Son Zechariah norris.   Staff Present: Blayne Trejo MD    Patient / Family Goals of Care: Review cognitive decline and associated symptoms, discuss treatment options and care planning.     Medical Concerns (Current/Historical): Memory changes Vitamin B12 deficiency    Geriatric Syndromes/Age Related Syndromes: other specified hearing loss of both ears    Neuropsychological  Memory change  [place] Cognitive Assessment: 28/30  Geriatric Depression Screen: 2/15  NeuroTrax:  102.8 (global cognitive score)  105.2 (memory)   100.3 (executive function)  105.9 (attention)   106.9 (information processing speed)  89.2 (visual spatial)  106.9 (verbal function)  105.1 (motor skills)    Decision-making capacity: Intact  Driving safety: Can drive during day time and advise being careful when driving nighttime and bad weather conditions     Remain active physically, mentally and socially  Pharmaceutical and non-pharmaceutical interventions discussed  Engage in cognitively challenging exercises such as crosswords and puzzles  Maintain chronic conditions under control  Referral sent to OT for cognitive activities   Repeat cognitive assessment in 12 months    Diagnostic Studies    Review of bloodwork: Folate, Vitamin D  Review of MRI NeuroQuant: IMPRESSION: No acute infarct, significant mass effect or midline shift. Mild chronic microangiopathy. NeuroQuant analysis was performed: Normal study; Does not support neurodegeneration    Physical Finding Impacting Function     Timed Up and Go Test: normal   Fall Risk: None   Activities of Daily Living: assist   Instrumental Activities of Daily Living:  assist    Encourage appropriate footwear at all times  Review fall risk prevention tips and adjust within the home environment as needed    Medications Reviewed     Medications seem appropriate for present conditions  Check with PCP before using over the counter medications  Avoid over the counter medications that can affect cognition (e.g., Benadryl, Tylenol PM)   Avoid NSAIDs due to risk of GI bleed and renal impairment    Other Findings     Overall health  BMI: 26.15 kg/m2  Maintain well-balanced diet  Continue following with primary care physician regularly  Vitamin B12 deficiency  Vit B12 levels were low at 83  Advised to take Vit B12 1000mcg, PO, QD - Currently taken   Other specified hearing loss of both ears  Pt refers hx of hearing loss and would like to be assessed  Sent referral for audiology   Hearing normal to moderate sensorineural hearing loss b/l - pt hearing well     Recommended Health Maintenance   Immunizations, if not contraindicated:   Influenza vaccine yearly  Pneumo vaccine every 5 years (65 years and over)  Shingles vaccine  COVID-19 vaccine    Social / Safety Considerations  Consider a [facility] for positive socialization, physical exercise, cognitive stimulation  Stay in touch with family and friends  Plan self-care activities for your mental well-being each week  Consider volunteering through [employer] ([phone] or [employer]  Recommend review of fall risk prevention tips  Recommend use of fall precautions including fall alert device  Consider assistance for medication administration such as blister packaging or use of an automated pill dispenser  Consider contacting your local [facility] Agency on Aging for possible eligible programs such as OPTIONS, Caregiver Support Program, or WAIVER    Long Term Care Issues  Maintain updated advance directives and provide a copy to your primary care provider  Educational information provided    Physical Exam  Vitals and  nursing note reviewed.   Constitutional:       General: She is awake. She is not in acute distress.     Appearance: Normal appearance. She is well-developed and overweight. She is not ill-appearing, toxic-appearing or diaphoretic.   HENT:      Head: Normocephalic and atraumatic.      Right Ear: External ear normal.      Left Ear: External ear normal.      Nose: Nose normal.      Mouth/Throat:      Mouth: Mucous membranes are moist.   Eyes:      Conjunctiva/sclera: Conjunctivae normal.   Cardiovascular:      Rate and Rhythm: Normal rate and regular rhythm.      Heart sounds: Normal heart sounds. No murmur heard.  Pulmonary:      Effort: Pulmonary effort is normal. No respiratory distress.      Breath sounds: Normal breath sounds.   Abdominal:      General: Abdomen is flat. There is no distension.      Palpations: Abdomen is soft.      Tenderness: There is no abdominal tenderness.   Musculoskeletal:         General: No swelling.      Cervical back: Neck supple.   Skin:     General: Skin is warm and dry.      Capillary Refill: Capillary refill takes less than 2 seconds.   Neurological:      Mental Status: She is alert, oriented to person, place, and time and easily aroused.   Psychiatric:         Mood and Affect: Mood normal.         Behavior: Behavior normal. Behavior is cooperative.         Thought Content: Thought content normal.         Judgment: Judgment normal.     Patient and family verbalized understanding of above care plan.    For care coordination purposes, this care plan will be shared with your primary care provider. With any questions, please contact our office at [phone].     It was a pleasure to be of service to Sofia Lopez.    Kelley Nguyễn MD, [facility] - PGY4  [facility] / Geriatric Fellow     Date: 2/19/2025  Time: 3:35 PM

## 2025-02-18 ENCOUNTER — TELEPHONE (OUTPATIENT)
Age: 70
End: 2025-02-18

## 2025-02-18 NOTE — TELEPHONE ENCOUNTER
Patient has appt tomorrow with the office she wanted to confirm that the office can have her sister Janet Phone # [phone] and her Son Zechariah phone # [phone] can attend the visit through virtual. Please let patient know if this can't be done.    You can reach her at [phone] it is ok to let a message

## 2025-02-19 ENCOUNTER — OFFICE VISIT (OUTPATIENT)
Age: 70
End: 2025-02-19
Payer: MEDICARE

## 2025-02-19 VITALS
OXYGEN SATURATION: 98 % | BODY MASS INDEX: 26.81 KG/M2 | HEART RATE: 81 BPM | TEMPERATURE: 97.6 F | HEIGHT: 61 IN | WEIGHT: 142 LBS | DIASTOLIC BLOOD PRESSURE: 70 MMHG | SYSTOLIC BLOOD PRESSURE: 152 MMHG

## 2025-02-19 DIAGNOSIS — H91.8X3 OTHER SPECIFIED HEARING LOSS OF BOTH EARS: ICD-10-CM

## 2025-02-19 DIAGNOSIS — R41.3 MEMORY CHANGES: Primary | Chronic | ICD-10-CM

## 2025-02-19 DIAGNOSIS — E53.8 VITAMIN B12 DEFICIENCY: ICD-10-CM

## 2025-02-19 PROCEDURE — 99483 ASSMT & CARE PLN PT COG IMP: CPT | Performed by: FAMILY MEDICINE

## 2025-02-19 NOTE — PROGRESS NOTES
[facility]  [address], [address]  [place] [address]  [phone]    Care Conference: Resources Provided    LSW provided the following resources, along with a copy of care plan:  [id number] [place] [id number]    General Information  - 10 Ways to Love Your Brain    Safety  - CDC fall prevention / home safety checklist    Community Resources  - [facility] Resource Guide (contains information about higher levels of care, homecare, etc.)
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
## 2025-03-25 ENCOUNTER — OFFICE VISIT (OUTPATIENT)
Dept: PSYCHIATRY | Facility: CLINIC | Age: 70
End: 2025-03-25
Payer: MEDICARE

## 2025-03-25 VITALS
DIASTOLIC BLOOD PRESSURE: 74 MMHG | SYSTOLIC BLOOD PRESSURE: 110 MMHG | HEIGHT: 61 IN | WEIGHT: 140 LBS | BODY MASS INDEX: 26.43 KG/M2 | HEART RATE: 99 BPM

## 2025-03-25 DIAGNOSIS — G47.09 OTHER INSOMNIA: Chronic | ICD-10-CM

## 2025-03-25 DIAGNOSIS — F31.76 BIPOLAR I DISORDER, MOST RECENT EPISODE DEPRESSED, IN REMISSION (HCC): Primary | Chronic | ICD-10-CM

## 2025-03-25 DIAGNOSIS — F41.1 GAD (GENERALIZED ANXIETY DISORDER): Chronic | ICD-10-CM

## 2025-03-25 DIAGNOSIS — F90.2 ATTENTION DEFICIT HYPERACTIVITY DISORDER (ADHD), COMBINED TYPE: Chronic | ICD-10-CM

## 2025-03-25 PROBLEM — E53.8 B12 DEFICIENCY: Status: ACTIVE | Noted: 2025-01-10

## 2025-03-25 PROBLEM — M65.341 TRIGGER FINGER, RIGHT RING FINGER: Status: ACTIVE | Noted: 2024-07-30

## 2025-03-25 PROBLEM — G56.21 LESION OF ULNAR NERVE, RIGHT UPPER LIMB: Status: ACTIVE | Noted: 2021-04-26

## 2025-03-25 PROCEDURE — G2211 COMPLEX E/M VISIT ADD ON: HCPCS | Performed by: PSYCHIATRY & NEUROLOGY

## 2025-03-25 PROCEDURE — 99214 OFFICE O/P EST MOD 30 MIN: CPT | Performed by: PSYCHIATRY & NEUROLOGY

## 2025-03-25 PROCEDURE — 90833 PSYTX W PT W E/M 30 MIN: CPT | Performed by: PSYCHIATRY & NEUROLOGY

## 2025-03-25 RX ORDER — METHYLPHENIDATE HYDROCHLORIDE 10 MG/1
10 TABLET ORAL 2 TIMES DAILY
Qty: 60 TABLET | Refills: 0 | Status: SHIPPED | OUTPATIENT
Start: 2025-03-25 | End: 2025-04-24

## 2025-03-25 RX ORDER — TRAZODONE HYDROCHLORIDE 50 MG/1
50 TABLET ORAL
Qty: 90 TABLET | Refills: 2 | Status: SHIPPED | OUTPATIENT
Start: 2025-03-25 | End: 2025-12-20

## 2025-03-25 RX ORDER — METHYLPHENIDATE HYDROCHLORIDE 10 MG/1
10 TABLET ORAL 2 TIMES DAILY
Qty: 60 TABLET | Refills: 0 | Status: SHIPPED | OUTPATIENT
Start: 2025-04-24 | End: 2025-05-24

## 2025-03-25 RX ORDER — LAMOTRIGINE 200 MG/1
200 TABLET ORAL DAILY
Qty: 90 TABLET | Refills: 2 | Status: SHIPPED | OUTPATIENT
Start: 2025-03-25 | End: 2025-12-20

## 2025-03-25 RX ORDER — METHYLPHENIDATE HYDROCHLORIDE 10 MG/1
10 TABLET ORAL 2 TIMES DAILY
Qty: 60 TABLET | Refills: 0 | Status: SHIPPED | OUTPATIENT
Start: 2025-05-24 | End: 2025-06-23

## 2025-03-25 NOTE — BH TREATMENT PLAN
"TREATMENT PLAN (Medication Management Only)        [facility]    Name/Date of Birth/MRN:  Sofia Lopez 70 y.o. 1955 MRN: [id number]  Date of Treatment Plan: March 25, 2025  Diagnosis/Diagnoses:   1. Bipolar I disorder, most recent episode depressed, in remission (HCC)    2. RAMEZ (generalized anxiety disorder)    3. Attention deficit hyperactivity disorder (ADHD), combined type    4. Other insomnia      Strengths/Personal Resources for Self-Care: \"I have a social base, family and friends, I know when I need help\".  Area/Areas of need (in own words): \"see my granddaughter\".  1. Long Term Goal:   maintain control of anxiety, maintain control of depression, control ADHD symptoms  Target Date: 4 months - 7/25/2025  Person/Persons responsible for completion of goal: Sofia  2.  Short Term Objective (s) - How will we reach this goal?:   A.  Provider new recommended medication/dosage changes and/or continue medication(s): continue current medications as prescribed (Trazodone, Lamictal, and Ritalin).  B.  N/A.  C.  N/A.  Target Date: 4 months - 7/25/2025  Person/Persons Responsible for Completion of Goal: Sofia   Progress Towards Goals: stable  Treatment Modality: medication management every 4 months  Review due 180 days from date of this plan: 6 months - 9/25/2025  Expected length of service: maintenance unless revised  My Physician/PA/NP and I have developed this plan together and I agree to work on the goals and objectives. I understand the treatment goals that were developed for my treatment.  Electronic Signatures: on file (unless signed below)    Mariella Green MD 03/25/25  "

## 2025-03-25 NOTE — ASSESSMENT & PLAN NOTE
Stable    Continue Trazodone 50 mg at bedtime as needed to help with insomnia  Orders:    traZODone (DESYREL) 50 mg tablet; Take 1 tablet (50 mg total) by mouth daily at bedtime as needed for sleep

## 2025-03-25 NOTE — ASSESSMENT & PLAN NOTE
Mood is stable    Continue Lamictal 200 mg at bedtime to help with mood stabilization  Orders:    lamoTRIgine (LaMICtal) 200 MG tablet; Take 1 tablet (200 mg total) by mouth daily

## 2025-03-25 NOTE — ASSESSMENT & PLAN NOTE
Stable    Continue Ritalin 10 mg twice a day to help with attention and concentration  Orders:    methylphenidate (Ritalin) 10 mg tablet; Take 1 tablet (10 mg total) by mouth 2 (two) times a day Max Daily Amount: 20 mg    methylphenidate (Ritalin) 10 mg tablet; Take 1 tablet (10 mg total) by mouth 2 (two) times a day Max Daily Amount: 20 mg Do not start before April 24, 2025.    methylphenidate (Ritalin) 10 mg tablet; Take 1 tablet (10 mg total) by mouth 2 (two) times a day Max Daily Amount: 20 mg Do not start before May 24, 2025.

## 2025-05-07 DIAGNOSIS — Z78.0 MENOPAUSE: ICD-10-CM

## 2025-05-07 RX ORDER — ESTRADIOL 0.5 MG/1
0.5 TABLET ORAL DAILY
Qty: 90 TABLET | Refills: 0 | Status: SHIPPED | OUTPATIENT
Start: 2025-05-07 | End: 2025-08-05

## 2025-07-13 DIAGNOSIS — Z78.0 MENOPAUSE: ICD-10-CM

## 2025-07-14 RX ORDER — ESTRADIOL 0.5 MG/1
0.5 TABLET ORAL DAILY
Qty: 90 TABLET | Refills: 1 | Status: SHIPPED | OUTPATIENT
Start: 2025-07-14

## 2025-08-14 ENCOUNTER — TELEPHONE (OUTPATIENT)
Dept: PSYCHIATRY | Facility: CLINIC | Age: 70
End: 2025-08-14

## (undated) DEVICE — SPONGE STICK WITH PVP-I: Brand: KENDALL

## (undated) DEVICE — UNDYED MONOFILAMENT (POLYDIOXANONE), ABSORBABLE SURGICAL SUTURE: Brand: PDS

## (undated) DEVICE — SYRINGE 10ML LL

## (undated) DEVICE — SUT ETHILON 4-0 PS-2 18 IN 1667H

## (undated) DEVICE — CHEST/BREAST DRAPE: Brand: CONVERTORS

## (undated) DEVICE — SINGLE PORT MANIFOLD: Brand: NEPTUNE 2

## (undated) DEVICE — NEEDLE BLUNT 18 G X 1 1/2IN

## (undated) DEVICE — SUT MONOCRYL 3-0 PS-2 27 IN Y427H

## (undated) DEVICE — SUT VICRYL 4-0 P-3 18 IN J494G

## (undated) DEVICE — NEEDLE 27 G X 1 1/2

## (undated) DEVICE — SUPER SPONGES,MEDIUM: Brand: KERLIX

## (undated) DEVICE — COTTON TIP APPLICTOR 2 PK

## (undated) DEVICE — SCD SEQUENTIAL COMPRESSION COMFORT SLEEVE MEDIUM KNEE LENGTH: Brand: KENDALL SCD

## (undated) DEVICE — SUT VICRYL 4-0 RB-1 27 IN J214H

## (undated) DEVICE — NEEDLE 25G X 1 1/2

## (undated) DEVICE — DRESSING XEROFORM 5 X 9

## (undated) DEVICE — PENCIL ELECTROSURG E-Z CLEAN -0035H

## (undated) DEVICE — LIGHT GLOVE GREEN

## (undated) DEVICE — SUT CHROMIC 4-0 PS-2 18 IN 1637G

## (undated) DEVICE — POV-IOD SOLUTION 4OZ BT

## (undated) DEVICE — TIBURON SPLIT SHEET: Brand: CONVERTORS

## (undated) DEVICE — SKIN MARKER DUAL TIP WITH RULER CAP, FLEXIBLE RULER AND LABELS: Brand: DEVON

## (undated) DEVICE — STERILE POLYISOPRENE POWDER-FREE SURGICAL GLOVES: Brand: PROTEXIS

## (undated) DEVICE — DISPOSABLE OR TOWEL: Brand: CARDINAL HEALTH

## (undated) DEVICE — TELFA NON-ADHERENT ABSORBENT DRESSING: Brand: TELFA

## (undated) DEVICE — BULB SYRINGE,IRRIGATION WITH PROTECTIVE CAP: Brand: DOVER

## (undated) DEVICE — TRAY FOLEY 16FR URIMETER SURESTEP

## (undated) DEVICE — SURGICAL CLIPPER BLADE GENERAL USE

## (undated) DEVICE — SUT VICRYL 2-0 CT-1 27 IN J259H

## (undated) DEVICE — CANNISTER WASTE IMPLOSION PROOF

## (undated) DEVICE — SUT ETHILON 5-0 P-3 18 IN 698H

## (undated) DEVICE — SYRINGE 1ML LL POLYCARBONATE

## (undated) DEVICE — BASIC PACK: Brand: CONVERTORS

## (undated) DEVICE — CRADLE EXTREMITY UNIVERSAL CONTOURED

## (undated) DEVICE — SUT PROLENE 0 CT-1 30 IN 8424H

## (undated) DEVICE — ELECTRODE NEEDLE MEGAFINE 2IN E-Z CLEAN MEGADYNE -0118

## (undated) DEVICE — 1820 FOAM BLOCK NEEDLE COUNTER: Brand: DEVON

## (undated) DEVICE — TUBING SUCTION 5MM X 12 FT

## (undated) DEVICE — TUBING INFILTRATION 9FT

## (undated) DEVICE — STANDARD SURGICAL GOWN, L: Brand: CONVERTORS

## (undated) DEVICE — SPONGE 4 X 4 XRAY 16 PLY STRL LF RFD

## (undated) DEVICE — SYRINGE 30ML LL

## (undated) DEVICE — SUT PLAIN 5-0 PC-1 18 IN 1915G

## (undated) DEVICE — KERLIX BANDAGE ROLL: Brand: KERLIX

## (undated) DEVICE — SUT MONOCRYL 5-0 P-3 18 IN Y493G

## (undated) DEVICE — ELECTRODE BLADE MOD E-Z CLEAN 2.5IN 6.4CM -0012M

## (undated) DEVICE — SPONGE LAP 18 X 18 IN STRL RFD

## (undated) DEVICE — SUT VICRYL 3-0 SH 27 IN J416H

## (undated) DEVICE — ADHESIVE SKIN HIGH VISCOSITY EXOFIN 1ML

## (undated) DEVICE — 3M™ STERI-STRIP™ REINFORCED ADHESIVE SKIN CLOSURES, R1547, 1/2 IN X 4 IN (12 MM X 100 MM), 6 STRIPS/ENVELOPE: Brand: 3M™ STERI-STRIP™

## (undated) DEVICE — SUT ETHILON 6-0 P-3 18 IN 1698G

## (undated) DEVICE — TUBING LIPOSUCTION ASPIRATION 12FT STERILE

## (undated) DEVICE — SUT ETHILON 3-0 PS-1 18 IN 1663G

## (undated) DEVICE — BINDER ABDOMINAL 46-62 IN

## (undated) DEVICE — SUT VICRYL 3-0 REEL 54 IN J285G

## (undated) DEVICE — YANKAUER,OPEN TIP, NO VENT: Brand: ARGYLE

## (undated) DEVICE — NEEDLE BLUNT 18 G X 1 1/2 W FILTER

## (undated) DEVICE — OCCLUSIVE GAUZE STRIP,3% BISMUTH TRIBROMOPHENATE IN PETROLATUM BLEND: Brand: XEROFORM

## (undated) DEVICE — INTENDED FOR TISSUE SEPARATION, AND OTHER PROCEDURES THAT REQUIRE A SHARP SURGICAL BLADE TO PUNCTURE OR CUT.: Brand: BARD-PARKER ® SAFETYLOCK CARBON RIB-BACK BLADES